# Patient Record
Sex: FEMALE | Race: WHITE | NOT HISPANIC OR LATINO | Employment: PART TIME
[De-identification: names, ages, dates, MRNs, and addresses within clinical notes are randomized per-mention and may not be internally consistent; named-entity substitution may affect disease eponyms.]

---

## 2017-10-14 ENCOUNTER — HEALTH MAINTENANCE LETTER (OUTPATIENT)
Age: 32
End: 2017-10-14

## 2018-01-25 ENCOUNTER — TRANSFERRED RECORDS (OUTPATIENT)
Dept: HEALTH INFORMATION MANAGEMENT | Facility: CLINIC | Age: 33
End: 2018-01-25

## 2019-10-17 ENCOUNTER — OFFICE VISIT - HEALTHEAST (OUTPATIENT)
Dept: FAMILY MEDICINE | Facility: CLINIC | Age: 34
End: 2019-10-17

## 2019-10-17 DIAGNOSIS — J02.9 ACUTE VIRAL PHARYNGITIS: ICD-10-CM

## 2019-10-17 DIAGNOSIS — R03.0 ELEVATED BLOOD PRESSURE READING WITHOUT DIAGNOSIS OF HYPERTENSION: ICD-10-CM

## 2019-10-17 DIAGNOSIS — R07.0 THROAT PAIN: ICD-10-CM

## 2019-10-17 LAB — DEPRECATED S PYO AG THROAT QL EIA: NORMAL

## 2019-10-18 LAB — GROUP A STREP BY PCR: NORMAL

## 2019-11-19 ENCOUNTER — OFFICE VISIT - HEALTHEAST (OUTPATIENT)
Dept: FAMILY MEDICINE | Facility: CLINIC | Age: 34
End: 2019-11-19

## 2019-11-19 DIAGNOSIS — A08.4 VIRAL GASTROENTERITIS: ICD-10-CM

## 2020-02-16 ENCOUNTER — HEALTH MAINTENANCE LETTER (OUTPATIENT)
Age: 35
End: 2020-02-16

## 2020-07-09 ENCOUNTER — TRANSFERRED RECORDS (OUTPATIENT)
Dept: HEALTH INFORMATION MANAGEMENT | Facility: CLINIC | Age: 35
End: 2020-07-09

## 2021-06-02 NOTE — PROGRESS NOTES
Subjective:   Yocasta Barton is a 34 y.o. female and is new to [facility].  Roomed by: Jluis Gray    Refills needed? No    Do you have any forms that need to be filled out? No      Chief Complaint   Patient presents with     Sore Throat     Ear Pain     hread pressure     mucus     brown spot   Says she had a Qtip on her left ear and started having. Right ear pain started yesterday. Woke up with swollen glands today. Admits painful swallowing. Admits frontal headaches. Felt feverish on 10/12. Admits some nasal congestion today with intermittent coughing. Says voice is hoarse. Denies shortness of breath or shortness of breath. Admits being able to eat, drink and urinate normally. Denies any recent nausea, vomiting or diarrhea. Admits some belly pain in left upper abdomen earlier today which has since resolved. Energy level has not changed.     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    Objective:     Vitals:    10/17/19 1533 10/17/19 1610   BP: (!) 134/92 134/90   Patient Position: Sitting    Pulse: 87    Resp: 17    Temp: 98.9  F (37.2  C)    SpO2: 100%    Weight: 158 lb (71.7 kg)    Gen - Pt in NAD  Eyes - Conjunctiva non injected, no drainage  Face - non TTP over frontal sinus areas; non TTP over maxillary areas  Ears - external canals - no induration, Right TM - no5 injected, Left TM - not injected   Nose - not congested, no nasal  drainage  Pharynx - not injected, tonsils 1+ size  Neck - supple, no cervical adenopathy, no masses  Cor - RRR w/o murmur  Lungs - Good air entry; no wheezes or crackles noted on auscultation - no coughing noted  Skin - no lesions, no rashes noted  Neuro - non focal  Psych - Affect - Euthymic, well groomed, speech not pressured, good insight, no flight of ideas      Results for orders placed or performed in visit on 10/17/19   Rapid Strep A Screen-Throat   Result Value Ref Range    Rapid Strep A Antigen No Group A Strep detected, presumptive negative No Group A Strep detected, presumptive negative   Lab result discussed on day of visit.     No results found.   Assessment - Plan   Medical Decision Making - No clinical findings indicative of bacterial infection requiring antibiotics, such as pneumonia, sinusitis or otitis media were ascertained from today's evaluation. RS was negative. Presentation and clinical findings are consistent with a viral process. Symptomatic management and when to follow up discussed as described in Patient Instructions.       1. Acute viral pharyngitis  - Group A Strep, RNA Direct Detection, Throat    2. Elevated blood pressure reading without diagnosis of hypertension    3. Throat pain  - Rapid Strep A Screen-Throat      At the conclusion of the encounter, assessment and plan were discussed. All questions were answered. The patient or guardian acknowledged understanding and was involved in the decision making regarding the overall care plan.    Patient Instructions   1. Keep well hydrated  2. May alternate Tylenol every 6 hours with ibuprofen every 6 hours as needed for fever or pain  3. Follow up with Primary Care provider regarding elevated blood pressure.  4. If you have any questions, call the clinic number  - it's answered 24/7  - You will be contacted within the next 48 hours ONLY if the confirmatory strep test is positive.   - Antibiotics will be prescribed if indicated.  - No  sharing of food or beverage, until 48 hours is past

## 2021-06-03 VITALS
OXYGEN SATURATION: 100 % | WEIGHT: 158 LBS | DIASTOLIC BLOOD PRESSURE: 90 MMHG | RESPIRATION RATE: 17 BRPM | SYSTOLIC BLOOD PRESSURE: 134 MMHG | BODY MASS INDEX: 28.67 KG/M2 | TEMPERATURE: 98.9 F | HEART RATE: 87 BPM

## 2021-06-03 NOTE — PROGRESS NOTES
[facility] Note                                                                                 Date of Visit: 11/19/2019     Chief Complaint   Yocasta Barton is a(n) 34 y.o. White or  female who presents to [facility] with the following complaint(s):  Diarrhea (4 days) and Melena (since yesterday)       Assessment and Plan   1. Viral gastroenteritis  - ondansetron (ZOFRAN ODT) 4 MG disintegrating tablet; Take 1 tablet (4 mg total) by mouth every 8 (eight) hours as needed for nausea.  Dispense: 15 tablet; Refill: 0      Reassured patient that the recent dark discoloration of her stools is most likely due to use of Pepto-Bismol rather than melena secondary to upper GI bleeding. Reviewed typical clinical course and duration of gastrointestinal symptoms with the patient. Discussed symptomatic / supportive cares, including diet progression. Prescribed ondansetron to be used as needed for nausea / vomiting. Discussed close monitoring of hydration status and signs / symptoms of dehydration that would prompt reevaluation. Work excuse provided for yesterday, today, and tomorrow.    Counseled patient regarding assessment and plan for evaluation and treatment. Questions were answered. See AVS for the specific written instructions and educational handout(s) regarding viral gastroenteritis that were provided at the conclusion of the visit.     Discussed signs / symptoms that warrant urgent / emergent medical attention.     Follow up within 2 days if diarrhea persists, as workup for infectious diarrhea would be indicated at that point.      History of Present Illness   Primary symptom: Diarrhea  Onset: 3 days ago  Frequency: Every few hours  Progression: Persisting  Fecal color: Light brown  Fecal consistency: Watery  Malodorous: Initially  Fatty component: Recently  Melena: Stools have been dark since yesterday evening.   Hematochezia: No  Exacerbating factors: None  Relieving factors: No  Abdominal pain:  Cramping prior to bowel movement. Improves with bowel movement, eructation, or flatulence.   Nausea: Yes  Vomitin episodes  Fevers: No  Chills: Yes  Additional symptoms: Having some low back ache. Does not feel lightheaded. Currently menstruating.   History of similar episodes: No  History of Clostridium difficile: No  Ill contacts: Works in a  center.   Recent antibiotic use: No  Consumption of contaminated water: No  Concern for food-borne illness: No  History of abdominal surgery: No  Tobacco use / exposure: Currently smokes -1/3 ppd.   Additional information: Has missed work yesterday and today.      Review of Systems   Review of Systems   All other systems reviewed and are negative.       Physical Exam   Vitals:    19 1330 19 1332   BP: 119/86    Pulse: 93 91   Resp: 16    Temp: 97  F (36.1  C) 98.5  F (36.9  C)   TempSrc:  Oral   SpO2: 97% 99%   Weight: 155 lb (70.3 kg)      Physical Exam  Vitals signs and nursing note reviewed.   Constitutional:       General: She is not in acute distress.     Appearance: She is well-developed and normal weight. She is not ill-appearing or toxic-appearing.   HENT:      Mouth/Throat:      Mouth: Mucous membranes are moist. No oral lesions.   Eyes:      General: Lids are normal. No scleral icterus.     Conjunctiva/sclera: Conjunctivae normal.   Cardiovascular:      Rate and Rhythm: Normal rate and regular rhythm.      Heart sounds: S1 normal and S2 normal. No murmur. No friction rub. No gallop.    Pulmonary:      Effort: Pulmonary effort is normal.      Breath sounds: Normal breath sounds. No stridor. No wheezing, rhonchi or rales.   Abdominal:      General: Bowel sounds are normal. There is no distension.      Palpations: Abdomen is soft. There is no hepatomegaly, splenomegaly or mass.      Tenderness: There is abdominal tenderness in the right lower quadrant, suprapubic area and left lower quadrant. There is no right CVA tenderness, left CVA  tenderness, guarding or rebound.   Skin:     General: Skin is warm and dry.      Coloration: Skin is not jaundiced or pale.      Findings: No rash.   Neurological:      General: No focal deficit present.      Mental Status: She is alert and oriented to person, place, and time.          Diagnostic Studies   Laboratory:  N/A  Radiology:  N/A  Electrocardiogram:  N/A     Procedure Note   N/A     Pertinent History   The following portions of the patient's history were reviewed and updated as appropriate: allergies, current medications, past family history, past medical history, past social history, past surgical history and problem list.    Patient does not have a problem list on file.    Patient has a past medical history of Medical history reviewed with no changes.    Patient has a past surgical history that includes No past surgeries.    Patient's family history includes Hypertension in her father and mother.    Patient reports that she has been smoking. She has been smoking about 0.25 packs per day. She has never used smokeless tobacco. She reports current alcohol use. She reports that she does not use drugs.     Portions of this note have been dictated using voice recognition software. Any grammatical or context distortions are unintentional and inherent to the software.     Abundio Marr MD  [facility]

## 2021-06-04 VITALS
RESPIRATION RATE: 16 BRPM | WEIGHT: 155 LBS | TEMPERATURE: 98.5 F | DIASTOLIC BLOOD PRESSURE: 86 MMHG | BODY MASS INDEX: 28.12 KG/M2 | OXYGEN SATURATION: 99 % | HEART RATE: 91 BPM | SYSTOLIC BLOOD PRESSURE: 119 MMHG

## 2021-06-17 NOTE — PATIENT INSTRUCTIONS - HE
Patient Instructions by Abundio Marr MD at 11/19/2019 12:50 PM     Author: Abundio Marr MD Service: -- Author Type: Physician    Filed: 11/19/2019  2:01 PM Encounter Date: 11/19/2019 Status: Addendum    : Abundio Marr MD (Physician)    Related Notes: Original Note by Abundio Marr MD (Physician) filed at 11/19/2019  2:01 PM       -Your symptoms are consistent with viral gastroenteritis, though your symptoms are lasting slightly longer than typically expected.  -Use ondansetron as needed for nausea.  -Focus on a clear liquid diet for today.  -Then gradually resume a bland diet, starting with bananas, rice, applesauce, and toast.  As your symptoms improved, you may resume a regular diet as tolerated, resuming dairy products last.  -The dark coloration of your stool is likely related to the Pepto-Bismol you took yesterday.  Please stop using Pepto-Bismol and monitor for resolution of dark-colored stools.  -Follow-up within 2 days if your symptoms persist.  Return sooner if you develop severe abdominal pain, bloody diarrhea, lightheadedness, or other concerning symptoms.  Patient Education     Viral Gastroenteritis (Adult)    Gastroenteritis is commonly called the stomach flu. It is most often caused by a virus that affects the stomach and intestinal tract and usually lasts from 2 to 7 days. Common viruses causing gastroenteritis include norovirus, rotavirus, and hepatitis A. Non-viral causes of gastroenteritis include bacteria, parasites, and toxins.  The danger from repeated vomiting or diarrhea is dehydration. This is the loss of too much fluid from the body. When this occurs, body fluids must be replaced. Antibiotics do not help with this illness because it is usually viral.Simple home treatment will be helpful.  Symptoms of viral gastroenteritis may include:    Watery, loose stools    Stomach pain or abdominal cramps    Fever and chills    Nausea and vomiting    Loss of bowel  control    Headache  Home care  Gastroenteritis is transmitted by contact with the stool or vomit of an infected person. This can occur from person to person or from contact with a contaminated surface.  Follow these guidelines when caring for yourself at home:    If symptoms are severe, rest at home for the next 24 hours or until you are feeling better.    Wash your hands with soap and water or use alcohol-based  to prevent the spread of infection. Wash your hands after touching anyone who is sick.    Wash your hands or use alcohol-based  after using the toilet and before meals. Clean the toilet after each use.  Remember these tips when preparing food:    People with diarrhea should not prepare or serve food to others. When preparing foods, wash your hands before and after.    Wash your hands after using cutting boards, countertops, knives, or utensils that have been in contact with raw food.    Keep uncooked meats away from cooked and ready-to-eat foods.  Medicine  You may use acetaminophen or NSAID medicines like ibuprofen or naproxen to control fever unless another medicine was given. If you have chronic liver or kidney disease, talk with your healthcare provider before using these medicines. Also talk with your provider if you've had a stomach ulcer or gastrointestinal bleeding. Don't give aspirin to anyone under 18 years of age who is ill with a fever. It may cause severe liver damage. Don't use NSAIDS is you are already taking one for another condition (like arthritis) or are on aspirin (such as for heart disease or after a stroke).  If medicine for vomiting or diarrhea are prescribed, take these only as directed. Do not take over-the-counter medicines for vomiting or diarrhea unless instructed by your healthcare provider.  Diet  Follow these guidelines for food:    Water and liquids are important so you don't get dehydrated. Drink a small amount at a time or suck on ice chips if you are  vomiting.    If you eat, avoid fatty, greasy, spicy, or fried foods.    Don't eat dairy if you have diarrhea. This can make diarrhea worse.    Avoid tobacco, alcohol, and caffeine which may worsen symptoms.  During the first 24 hours (the first full day), follow the diet below:    Beverages. Sports drinks, soft drinks without caffeine, ginger ale, mineral water (plain or flavored), decaffeinated tea and coffee. If you are very dehydrated, sports drinks aren't a good choice. They have too much sugar and not enough electrolytes. In this case, commercially available products called oral rehydration solutions, are best.    Soups. Eat clear broth, consommé, and bouillon.    Desserts. Eat gelatin, popsicles, and fruit juice bars.  During the next 24 hours (the second day), you may add the following to the above:    Hot cereal, plain toast, bread, rolls, and crackers    Plain noodles, rice, mashed potatoes, chicken noodle or rice soup    Unsweetened canned fruit (avoid pineapple), bananas    Limit fat intake to less than 15 grams per day. Do this by avoiding margarine, butter, oils, mayonnaise, sauces, gravies, fried foods, peanut butter, meat, poultry, and fish.    Limit fiber and avoid raw or cooked vegetables, fresh fruits (except bananas), and bran cereals.    Limit caffeine and chocolate. Don't use spices or seasonings other than salt.    Limit dairy products.    Avoid alcohol.  During the next 24 hours:    Gradually resume a normal diet as you feel better and your symptoms improve.    If at any time it starts getting worse again, go back to clear liquids until you feel better.  Follow-up care  Follow up with your healthcare provider, or as advised. Call your provider if you don't get better within 24 hours or if diarrhea lasts more than a week. Also follow up if you are unable to keep down liquids and get dehydrated. If a stool (diarrhea) sample was taken, call as directed for the results.  Call 911  Call 911 if any  of these occur:    Trouble breathing    Chest pain    Confused    Severe drowsiness or trouble awakening    Fainting or loss of consciousness    Rapid heart rate    Seizure    Stiff neck  When to seek medical advice  Call your healthcare provider right away if any of these occur:    Abdominal pain that gets worse    Continued vomiting (unable to keep liquids down)    Frequent diarrhea (more than 5 times a day)    Blood in vomit or stool (black or red color)    Dark urine, reduced urine output, or extreme thirst    Weakness or dizziness    Drowsiness    Fever of 100.4 F (38 C) or higher, or as directed by your healthcare provider    New rash  Date Last Reviewed: 1/3/2016    6411-2943 [employer]. [address] [place] [address], [place] [address]. All rights reserved. This information is not intended as a substitute for professional medical care. Always follow your healthcare professional's instructions.

## 2021-06-17 NOTE — PATIENT INSTRUCTIONS - HE
Patient Instructions by Clau Moralez MD at 10/17/2019  3:30 PM     Author: Clau Moralez MD Service: -- Author Type: Physician    Filed: 10/17/2019  4:45 PM Encounter Date: 10/17/2019 Status: Addendum    : Clau Moralez MD (Physician)    Related Notes: Original Note by Clau Moralez MD (Physician) filed at 10/17/2019  4:44 PM       1. Keep well hydrated  2. May alternate Tylenol every 6 hours with ibuprofen every 6 hours as needed for fever or pain  3. Follow up with Primary Care provider regarding elevated blood pressure.  4. If you have any questions, call the clinic number  - it's answered 24/7  - You will be contacted within the next 48 hours ONLY if the confirmatory strep test is positive.   - Antibiotics will be prescribed if indicated.  - No sharing of food or beverage, until 48 hours is past     Patient Education     Viral Pharyngitis (Sore Throat)    You or your child have pharyngitis (sore throat). This infection is caused by a virus. It can cause throat pain that is worse when swallowing, aching all over, headache, and fever. The infection may be spread by coughing, kissing, or touching others after touching your mouth or nose. Antibiotic medicines do not work against viruses. They are not used for treating this illness.  Home care    If symptoms are severe, you or your child should rest at home. Return to work or school when you or your child feel well enough.     You or your child should drink plenty of fluids to prevent dehydration.    Use throat lozenges or numbing throat sprays to help reduce pain. Gargling with warm salt water will also help reduce throat pain. Dissolve 1/2 teaspoon of salt in 1 glass of warm water. Children can sip on juice or a popsicle. Children 5 years and older can also suck on a lollipop or hard candy.    Dont eat salty or spicy foods or give them to your child. These can be irritating to the throat.  Medicines for a child: You can give your child  acetaminophen for fever, fussiness, or discomfort. In babies over 6 months of age, you may use ibuprofen instead of acetaminophen. If your child has chronic liver or kidney disease or ever had a stomach ulcer or GI bleeding, talk with your [facility] healthcare provider before giving these medicines. Aspirin should never be used by any child under 18 years of age who has a fever. It may cause severe liver damage.  Medicines for an adult: You may use acetaminophen or ibuprofen to control pain or fever, unless another medicine was prescribed for this. If you have chronic liver or kidney disease or ever had a stomach ulcer or GI bleeding, talk with your healthcare provider before using these medicines.  Follow-up care  Follow up with a healthcare provider or our staff if you or your child are not getting better over the next week.  When to seek medical advice  Call your healthcare provider right away if any of these occur:    Fever as directed by your healthcare provider.  For children, seek care if:  ? Your child is of any age and has repeated fevers above 104 F (40 C).  ? Your child is younger than 2 years of age and has a fever of 100.4 F (38 C) for more than 1 day.  ? Your child is 2 years old or older and has a fever of 100.4 F (38 C) for more than 3 days.    New or worsening ear pain, sinus pain, or headache    Painful lumps in the back of neck    Stiff neck    Lymph nodes are getting larger    Cant swallow liquids, a lot of drooling, or cant open mouth wide due to throat pain    Signs of dehydration, such as very dark urine or no urine, sunken eyes, dizziness    Trouble breathing or noisy breathing    Muffled voice    New rash    Other symptoms are getting worse  Date Last Reviewed: 10/1/2017    [id number] [employer]. [address] [place] [address]. All rights reserved. This information is not intended as a substitute for professional medical care. Always follow your healthcare  professional's instructions.             Patient Education     Earwax Removal    The ear canal makes earwax from the canals lining. The ears make wax to lubricate and protect the ear canal. The ear canal is the tube that connects the middle ear to the outside of the ear. The wax protects the ear from bacteria, infection, and damage from water or trauma.  The wax that forms in the canal naturally moves toward the outside of the ear and falls out. In some cases, the ear may make too much wax. If the wax causes problems or keeps the healthcare provider from seeing into the ear, the extra wax may be removed.  Too much wax can affect your hearing. It can cause itching. In rare cases, it can be painful. Earwax should not be removed unless it is causing a problem. You should not stick objects into your ear to remove wax unless told to do so by your healthcare provider.  Healthcare providers can remove earwax safely. It is important to stay still during the procedure to avoid damage to the ear canal. But removing earwax generally doesnt hurt. You will not usually need anesthesia or pain medicine when the provider removes the earwax.  A number of conditions lead to earwax buildup. These include some skin problems, a narrow ear canal, or ears that make too much earwax. Using cotton swabs in the canal pushes earwax deeper into the ear and contributes to the buildup of earwax.  Home care    The healthcare provider may recommend mineral oil or an over-the-counter eardrop to use at home to soften the earwax. Use these products only if the provider recommends them. Carefully follow the instructions given.    Dont use mineral oil or OTC eardrops if you might have an ear infection or a ruptured eardrum. Tell your healthcare provider right away if you have diabetes or an immune disorder.    Dont use cotton swabs in your ears. Cotton swabs may push wax deeper into the ear canal or damage the eardrum. Use cotton gauze or a wet  washcloth  to gently remove wax on the outside of the ear and around the opening to the ear canal.    Don't use any probing device or object such as cotton-tipped swabs or ana m pins to clean the inside of your ears.    Dont use ear candles to clean your ears. Candling can be dangerous. It can burn the ear canal. It can also make the condition worse instead of better.    Dont use cold water to rinse the ear. This will make you dizzy. If your provider tells you to rinse your ear, use only warm water or follow his or her instructions.    Check the ear for signs of infection or irritation listed below under When to seek medical advice.  Steps for using eardrops  1. Warm the medicine bottle by rubbing it between your hands for a few minutes.  2. Lie down on your side, with the affected ear up.  3. Place the recommended number of drops in the ear. Wet a cotton ball with the medicine. Gently put the cotton ball into the ear opening.  Follow-up care  Follow up with your healthcare provider, or as directed.  When to seek medical advice  Call the provider right away if you have:    Ear pain that gets worse    Fever of 100.4F F (38 C) or higher, or as directed by your healthcare provider    Worsening wax buildup    Severe pain, dizziness, or nausea    Bleeding from the ear    Hearing problems    Signs of irritation from the eardrops, such as burning, stinging, or swelling and tenderness    Foul-smelling fluid draining from the ear    Swelling, redness, or tenderness of the outer ear    Headache, neck pain, or stiff neck  Date Last Reviewed: 6/1/2017 2000-2017 [employer]. [address] [place] [id number]. All rights reserved. This information is not intended as a substitute for professional medical care. Always follow your healthcare professional's instructions.

## 2021-06-19 NOTE — LETTER
Letter by Abundio Marr MD at      Author: Abundio Marr MD Service: -- Author Type: --    Filed:  Encounter Date: 11/19/2019 Status: Signed         November 19, 2019     Patient: Yocasta Barton   YOB: 1985   Date of Visit: 11/19/2019       To Whom it May Concern:    Yocasta Barton was seen in my clinic on 11/19/2019.  Please excuse her absence from work on Monday, 11/18/2019, Tuesday, 11/19/2019, and Wednesday, 11/20/2019 due to illness.    If you have any questions or concerns, please don't hesitate to call.    Sincerely,         Electronically signed by Abundio Marr MD

## 2021-06-19 NOTE — LETTER
Letter by Clau Moralez MD at      Author: Clau Moralez MD Service: -- Author Type: --    Filed:  Encounter Date: 10/17/2019 Status: Signed         October 17, 2019     Patient: Yocasta Barton   YOB: 1985   Date of Visit: 10/17/2019       To Whom it May Concern:    Yocasta Barton was seen in my clinic on 10/17/2019.    She may return to work on 10/18/2019.     If you have any questions or concerns, please don't hesitate to call.    Sincerely,         Electronically signed by Clau Moralez MD

## 2021-07-28 ENCOUNTER — OFFICE VISIT (OUTPATIENT)
Dept: FAMILY MEDICINE | Facility: CLINIC | Age: 36
End: 2021-07-28
Payer: COMMERCIAL

## 2021-07-28 VITALS
BODY MASS INDEX: 30.03 KG/M2 | SYSTOLIC BLOOD PRESSURE: 110 MMHG | WEIGHT: 169.5 LBS | OXYGEN SATURATION: 97 % | DIASTOLIC BLOOD PRESSURE: 80 MMHG | HEART RATE: 97 BPM | HEIGHT: 63 IN

## 2021-07-28 DIAGNOSIS — F17.200 NICOTINE DEPENDENCE, UNCOMPLICATED, UNSPECIFIED NICOTINE PRODUCT TYPE: ICD-10-CM

## 2021-07-28 DIAGNOSIS — Z00.00 ROUTINE PHYSICAL EXAMINATION: Primary | ICD-10-CM

## 2021-07-28 DIAGNOSIS — K08.89 PAIN, DENTAL: ICD-10-CM

## 2021-07-28 DIAGNOSIS — S09.90XD CLOSED HEAD INJURY, SUBSEQUENT ENCOUNTER: ICD-10-CM

## 2021-07-28 DIAGNOSIS — Z11.3 SCREEN FOR STD (SEXUALLY TRANSMITTED DISEASE): ICD-10-CM

## 2021-07-28 DIAGNOSIS — Z30.41 ORAL CONTRACEPTIVE USE: ICD-10-CM

## 2021-07-28 LAB
ALBUMIN SERPL-MCNC: 3.6 G/DL (ref 3.5–5)
ALP SERPL-CCNC: 67 U/L (ref 45–120)
ALT SERPL W P-5'-P-CCNC: 14 U/L (ref 0–45)
ANION GAP SERPL CALCULATED.3IONS-SCNC: 12 MMOL/L (ref 5–18)
AST SERPL W P-5'-P-CCNC: 15 U/L (ref 0–40)
BILIRUB SERPL-MCNC: 0.5 MG/DL (ref 0–1)
BUN SERPL-MCNC: 7 MG/DL (ref 8–22)
CALCIUM SERPL-MCNC: 9 MG/DL (ref 8.5–10.5)
CHLORIDE BLD-SCNC: 108 MMOL/L (ref 98–107)
CHOLEST SERPL-MCNC: 179 MG/DL
CO2 SERPL-SCNC: 19 MMOL/L (ref 22–31)
CREAT SERPL-MCNC: 0.62 MG/DL (ref 0.6–1.1)
ERYTHROCYTE [DISTWIDTH] IN BLOOD BY AUTOMATED COUNT: 12.5 % (ref 10–15)
FASTING STATUS PATIENT QL REPORTED: ABNORMAL
GFR SERPL CREATININE-BSD FRML MDRD: >90 ML/MIN/1.73M2
GLUCOSE BLD-MCNC: 85 MG/DL (ref 70–125)
HCT VFR BLD AUTO: 41.2 % (ref 35–47)
HDLC SERPL-MCNC: 44 MG/DL
HGB BLD-MCNC: 13.7 G/DL (ref 11.7–15.7)
LDLC SERPL CALC-MCNC: 118 MG/DL
MCH RBC QN AUTO: 30.6 PG (ref 26.5–33)
MCHC RBC AUTO-ENTMCNC: 33.3 G/DL (ref 31.5–36.5)
MCV RBC AUTO: 92 FL (ref 78–100)
PLATELET # BLD AUTO: 298 10E3/UL (ref 150–450)
POTASSIUM BLD-SCNC: 4.1 MMOL/L (ref 3.5–5)
PROT SERPL-MCNC: 6.5 G/DL (ref 6–8)
RBC # BLD AUTO: 4.47 10E6/UL (ref 3.8–5.2)
SODIUM SERPL-SCNC: 139 MMOL/L (ref 136–145)
TRIGL SERPL-MCNC: 86 MG/DL
WBC # BLD AUTO: 9.3 10E3/UL (ref 4–11)

## 2021-07-28 PROCEDURE — 86780 TREPONEMA PALLIDUM: CPT | Performed by: FAMILY MEDICINE

## 2021-07-28 PROCEDURE — 36415 COLL VENOUS BLD VENIPUNCTURE: CPT | Performed by: FAMILY MEDICINE

## 2021-07-28 PROCEDURE — 87624 HPV HI-RISK TYP POOLED RSLT: CPT | Performed by: FAMILY MEDICINE

## 2021-07-28 PROCEDURE — 80061 LIPID PANEL: CPT | Performed by: FAMILY MEDICINE

## 2021-07-28 PROCEDURE — 99214 OFFICE O/P EST MOD 30 MIN: CPT | Mod: 25 | Performed by: FAMILY MEDICINE

## 2021-07-28 PROCEDURE — 80053 COMPREHEN METABOLIC PANEL: CPT | Performed by: FAMILY MEDICINE

## 2021-07-28 PROCEDURE — 87491 CHLMYD TRACH DNA AMP PROBE: CPT | Performed by: FAMILY MEDICINE

## 2021-07-28 PROCEDURE — G0123 SCREEN CERV/VAG THIN LAYER: HCPCS | Performed by: FAMILY MEDICINE

## 2021-07-28 PROCEDURE — 99395 PREV VISIT EST AGE 18-39: CPT | Performed by: FAMILY MEDICINE

## 2021-07-28 PROCEDURE — 87340 HEPATITIS B SURFACE AG IA: CPT | Performed by: FAMILY MEDICINE

## 2021-07-28 PROCEDURE — 87591 N.GONORRHOEAE DNA AMP PROB: CPT | Performed by: FAMILY MEDICINE

## 2021-07-28 PROCEDURE — 85027 COMPLETE CBC AUTOMATED: CPT | Performed by: FAMILY MEDICINE

## 2021-07-28 RX ORDER — IBUPROFEN 200 MG
200 TABLET ORAL EVERY 4 HOURS PRN
COMMUNITY
End: 2022-08-18

## 2021-07-28 RX ORDER — NORETHINDRONE ACETATE AND ETHINYL ESTRADIOL 1MG-20(21)
1 KIT ORAL DAILY
Qty: 84 TABLET | Refills: 2 | Status: SHIPPED | OUTPATIENT
Start: 2021-07-28 | End: 2022-04-13

## 2021-07-28 RX ORDER — AMOXICILLIN 875 MG
875 TABLET ORAL 2 TIMES DAILY
Qty: 10 TABLET | Refills: 0 | Status: SHIPPED | OUTPATIENT
Start: 2021-07-28 | End: 2021-10-26

## 2021-07-28 ASSESSMENT — ENCOUNTER SYMPTOMS
NECK STIFFNESS: 0
DYSPHORIC MOOD: 0
PARESTHESIAS: 0
CONSTITUTIONAL NEGATIVE: 1
HEADACHES: 1
LIGHT-HEADEDNESS: 0
NUMBNESS: 0
NECK PAIN: 0

## 2021-07-28 ASSESSMENT — ANXIETY QUESTIONNAIRES
GAD7 TOTAL SCORE: 13
1. FEELING NERVOUS, ANXIOUS, OR ON EDGE: MORE THAN HALF THE DAYS
3. WORRYING TOO MUCH ABOUT DIFFERENT THINGS: MORE THAN HALF THE DAYS
7. FEELING AFRAID AS IF SOMETHING AWFUL MIGHT HAPPEN: SEVERAL DAYS
IF YOU CHECKED OFF ANY PROBLEMS ON THIS QUESTIONNAIRE, HOW DIFFICULT HAVE THESE PROBLEMS MADE IT FOR YOU TO DO YOUR WORK, TAKE CARE OF THINGS AT HOME, OR GET ALONG WITH OTHER PEOPLE: SOMEWHAT DIFFICULT
5. BEING SO RESTLESS THAT IT IS HARD TO SIT STILL: MORE THAN HALF THE DAYS
2. NOT BEING ABLE TO STOP OR CONTROL WORRYING: MORE THAN HALF THE DAYS
6. BECOMING EASILY ANNOYED OR IRRITABLE: MORE THAN HALF THE DAYS
4. TROUBLE RELAXING: MORE THAN HALF THE DAYS

## 2021-07-28 ASSESSMENT — MIFFLIN-ST. JEOR: SCORE: 1415.04

## 2021-07-28 NOTE — PATIENT INSTRUCTIONS
The Benefits of Living Tobacco-Free  What do you want to improve in your life by quitting tobacco? Whether you smoke cigarettes, e-cigarettes, cigars, or a pipe, or use smokeless tobacco, there are many reasons to quit. Check off some reasons you want to be tobacco-free.  Health benefits  ___  I want to breathe without coughing or feeling short of breath.  ___  I want to reduce my risk for lung cancer, oral cancer, heart disease, bone problems such as osteoporosis and fractures, and chronic lung disease. Or reduce my risk for a serious lung injury called EVALI that may happen from vaping or using e-cigarettes.  ___  I want to have fewer wrinkles and softer skin.  ___  I want to improve my sense of taste and smell.  ___  For pregnant women: I want to reduce my risk for a miscarriage, stillbirth,  birth, or a low-birth-weight baby.  Personal benefits  ___  I want to be able to walk, go up stairs, and exercise without becoming out of breath.  ___  I want to feel more in control of my life.  ___  I want to have better-smelling hair, clothes, home, and car.  ___  I want to save time by not having to take smoke breaks, buy tobacco products, or hunt for a light.  ___  I want to have whiter teeth and fresher breath.  Family benefits  ___  I want to reduce my child's respiratory tract infections.  ___  I want to set a healthy example for my child.  ___  I want to have the energy needed to play with my children and not become out-of-breath  ___  I want to reduce my family s cancer risk.  Financial benefits  ___  I want to save hundreds of dollars each year that would be spent on tobacco products.  ___  I want to save money on medical bills.  ___  I want to save money on life, health, and car insurance premiums.  How much do you spend?  A tobacco habit is expensive. Do you know how much you spend on tobacco each year? Fill in the blanks below to find out:  A. How much do you pay for 1 pack of cigarettes, 1 cigar, 1  pouch of pipe tobacco, or 1 can of smokeless tobacco? $________  B. How many packs, cigars, pouches, or cans do you use each day? _______________  C. Multiply answer A with answer B:___________________  D. Multiply answer C with 365: $___________________. This is your yearly cost of using tobacco.  Besides the cost of buying tobacco, there are other costs. These include:    Costs of cleaning clothing, furniture, and car    Replacement costs for clothing and furniture    Medical costs for tobacco-related illnesses    Missed days of work because of illness    Higher health, life, and car insurance premiums  Get help    QuitNow, www.cdc.gov/tobacco/quit_smoking/, 800-QUIT-NOW (413-392-7490).    QuitLine, www.smokefree.gov, 877-44U-QUIT (696-040-6559).    [employer] last reviewed this educational content on 4/1/2020 2000-2021 [employer]. All rights reserved. This information is not intended as a substitute for professional medical care. Always follow your healthcare professional's instructions.        HOW TO QUIT SMOKING  Smoking is one of the hardest habits to break. About half of all those who have ever smoked have been able to quit, and most of those (about 70%) who still smoke want to quit. Here are some of the best ways to stop smoking.     KEEP TRYING:  It takes most smokers about 8 tries before they are finally able to fully quit. So, the more often you try and fail, the better your chance of quitting the next time! So, don't give up!    GO COLD TURKEY:  Most ex-smokers quit cold turkey. Trying to cut back gradually doesn't seem to work as well, perhaps because it continues the smoking habit. Also, it is possible to fool yourself by inhaling more while smoking fewer cigarettes. This results in the same amount of nicotine in your body!    GET SUPPORT:  Support programs can make an important difference, especially for the heavy smoker. These groups offer lectures, methods to change your behavior and  peer support. Call the free national Quitline for more information. 800-QUIT-NOW (769-081-2654). Low-cost or free programs are offered by many hospitals, local chapters of the American Lung Association (159-823-1252) and the American Cancer Society (308-607-5979). Support at home is important too. Non-smokers can help by offering praise and encouragement. If the smoker fails to quit, encourage them to try again!    OVER-THE-COUNTER MEDICINES:  For those who can't quit on their own, Nicotine Replacement Therapy (NRT) may make quitting much easier. Certain aids such as the nicotine patch, gum and lozenge are available without a prescription. However, it is best to use these under the guidance of your doctor. The skin patch provides a steady supply of nicotine to the body. Nicotine gum and lozenge gives temporary bursts of low levels of nicotine. Both methods take the edge off the craving for cigarettes. WARNING: If you feel symptoms of nicotine overdose, such as nausea, vomiting, dizziness, weakness, or fast heartbeat, stop using these and see your doctor.    PRESCRIPTION MEDICINES:  After evaluating your smoking patterns and prior attempts at quitting, your doctor may offer a prescription medicine such as bupropion (Zyban, Wellbutrin), varenicline (Chantix, Champix), a niocotine inhaler or nasal spray. Each has its unique advantage and side effects which your doctor can review with you.    HEALTH BENEFITS OF QUITTING:  The benefits of quitting start right away and keep improving the longer you go without smokin minutes: blood pressure and pulse return to normal  8 hours: oxygen levels return to normal  2 days: ability to smell and taste begins to improve as damaged nerves start to regrow  2-3 weeks: circulation and lung function improves  1-9 months: decreased cough, congestion and shortness of breath; less tired  1 year: risk of heart attack decreases by half  5 years: risk of lung cancer decreases by half;  risk of stroke becomes the same as a non-smoker  For information about how to quit smoking, visit the following links:  National Cancer [employer] ,   Clearing the Air, Quit Smoking Today   - an online booklet. http://www.smokefree.gov/pubs/clearing_the_air.pdf  Smokefree.gov http://smokefree.gov/  QuitNet http://www.quitnet.com/    6738-0817 Sonny Lobo, [address] [place] [address], [place] [address]. All rights reserved. This information is not intended as a substitute for professional medical care. Always follow your healthcare professional's instructions.
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
## 2021-07-29 LAB
HBV SURFACE AG SERPL QL IA: NONREACTIVE
T PALLIDUM AB SER QL: NEGATIVE

## 2021-07-30 LAB
C TRACH DNA SPEC QL NAA+PROBE: NEGATIVE
N GONORRHOEA DNA SPEC QL NAA+PROBE: NEGATIVE

## 2021-08-04 LAB
HUMAN PAPILLOMA VIRUS 16 DNA: NEGATIVE
HUMAN PAPILLOMA VIRUS 18 DNA: NEGATIVE
HUMAN PAPILLOMA VIRUS FINAL DIAGNOSIS: NORMAL
HUMAN PAPILLOMA VIRUS OTHER HR: NEGATIVE

## 2021-08-05 LAB
BKR LAB AP GYN ADEQUACY: NORMAL
BKR LAB AP GYN INTERPRETATION: NORMAL
BKR LAB AP HPV REFLEX: NORMAL
BKR LAB AP LMP: NORMAL
BKR LAB AP PREVIOUS ABNORMAL: NORMAL
PATH REPORT.COMMENTS IMP SPEC: NORMAL
PATH REPORT.RELEVANT HX SPEC: NORMAL

## 2021-08-16 ENCOUNTER — VIRTUAL VISIT (OUTPATIENT)
Dept: NEUROLOGY | Facility: CLINIC | Age: 36
End: 2021-08-16
Payer: COMMERCIAL

## 2021-08-16 DIAGNOSIS — G47.00 INSOMNIA, UNSPECIFIED TYPE: ICD-10-CM

## 2021-08-16 DIAGNOSIS — F07.81 POST CONCUSSION SYNDROME: Primary | ICD-10-CM

## 2021-08-16 DIAGNOSIS — F06.4 ANXIETY DISORDER DUE TO MEDICAL CONDITION: ICD-10-CM

## 2021-08-16 PROCEDURE — 99205 OFFICE O/P NEW HI 60 MIN: CPT | Mod: 95 | Performed by: NURSE PRACTITIONER
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
## 2021-09-07 ENCOUNTER — HOSPITAL ENCOUNTER (OUTPATIENT)
Dept: PHYSICAL THERAPY | Facility: CLINIC | Age: 36
Setting detail: THERAPIES SERIES
End: 2021-09-07
Attending: NURSE PRACTITIONER
Payer: COMMERCIAL

## 2021-09-07 PROCEDURE — 97535 SELF CARE MNGMENT TRAINING: CPT | Mod: GP | Performed by: PHYSICAL THERAPIST

## 2021-09-07 PROCEDURE — 97162 PT EVAL MOD COMPLEX 30 MIN: CPT | Mod: GP | Performed by: PHYSICAL THERAPIST

## 2021-09-07 NOTE — PROGRESS NOTES
Vestibular/Ocular Motor Test:     Not Tested Headache Dizziness Nausea Fogginess Comments   Baseline  0 0 0 5    Smooth Pursuits  0 0 0 5    Saccades-Horizontal  0 0 0 5 Left eye and left temple pressure   Saccades-Vertical  0 0 0 5 Left eye and left temple pressure   Convergence (Near Point)  0 0 0 5 (Near Point in CM)  Measure 1: 5 cm  Measure 2: 4cm  Measure 3 5cm   VOR Horizontal  0 0 0 5 Difficulty coordinating and keeping up with speed of movement   VOR Vertical  0 0 0 5 Difficulty coordinating and keeping up with speed of movement   Visual Motion Sensitivity Test  0 0 0 5 Difficulty coordinating and keeping up with speed of movement

## 2021-09-07 NOTE — PROGRESS NOTES
Rehabilitation Services        OUTPATIENT PHYSICAL THERAPY FUNCTIONAL EVALUATION  PLAN OF TREATMENT FOR OUTPATIENT REHABILITATION  (COMPLETE FOR INITIAL CLAIMS ONLY)  Patient's Last Name, First Name, M.I.  YOB: 1985  Yocasta Kohli     Provider's Name   Teodora Slade PT   Medical Record No.  [id number]     Start of Care Date:  09/07/21   Onset Date:  07/15/21   Type:     _X__PT   ____OT  ____SLP Medical Diagnosis:  Post concussion syndrome F07.81     PT Diagnosis:  Concussion Visits from SOC:  1                              __________________________________________________________________________________  Plan of Treatment/Functional Goals:  balance training, gait training, neuromuscular re-education, ROM, strengthening, stretching, manual therapy, joint mobilization           GOALS  HEP  Patient will demonstrate understanding and compliance to her HEP for continued wellbeing upon discharge from skilled physical therapy.  11/16/21    CSA  Patient will complete the CSA with a score of <16 to demonstrate decreased overall symptoms for return to work and leisure activities without limitation.  11/16/21    FGA  Patient will complete the FGA with a score of 28/30 to demonstrate improved balance and decreased risk for falls.  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   11/16/21                                                Therapy Frequency:  1 time/week (Decreasing in frequency as indicated)   Predicted Duration of Therapy Intervention:  8 weeks    Teodora Slade, PT, DPT                                    I CERTIFY THE NEED FOR THESE SERVICES FURNISHED UNDER        THIS PLAN OF TREATMENT AND WHILE UNDER MY CARE     (Physician co-signature of this document indicates  review and certification of the therapy plan).                Certification Date From:  09/07/21   Certification Date To:  11/16/21    Referring Provider:  Krista De Luna APRN CNP    Initial Assessment  See Epic Evaluation- Start of Care Date: 09/07/21
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
## 2021-09-14 ENCOUNTER — VIRTUAL VISIT (OUTPATIENT)
Dept: NEUROLOGY | Facility: CLINIC | Age: 36
End: 2021-09-14
Payer: COMMERCIAL

## 2021-09-14 DIAGNOSIS — T14.90XA TRAUMA: ICD-10-CM

## 2021-09-14 DIAGNOSIS — F07.81 POST CONCUSSION SYNDROME: Primary | ICD-10-CM

## 2021-09-14 PROCEDURE — 99214 OFFICE O/P EST MOD 30 MIN: CPT | Mod: 95 | Performed by: NURSE PRACTITIONER
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
## 2021-09-22 ENCOUNTER — HOSPITAL ENCOUNTER (OUTPATIENT)
Dept: PHYSICAL THERAPY | Facility: CLINIC | Age: 36
Setting detail: THERAPIES SERIES
End: 2021-09-22
Attending: NURSE PRACTITIONER
Payer: COMMERCIAL

## 2021-09-22 PROCEDURE — 97112 NEUROMUSCULAR REEDUCATION: CPT | Mod: GP | Performed by: PHYSICAL THERAPIST

## 2021-09-22 NOTE — DISCHARGE INSTRUCTIONS
9/22/21  IDEAS:    Wear a hat or visor.  This will block overhead light and will maybe decreased headache.    Try to get sleep cycle back to a more normal.      Triggers:  Loud voices, loud music.  Loud truck. Lack sleep.   Other?     ---use the earplugs  ---take breaks  ---keep walking the 20 min walks    Keep stressors down as much as you can.     Take care  Cathryn  [phone]           If you want a video visit, call and let us know.

## 2021-10-01 ENCOUNTER — HOSPITAL ENCOUNTER (OUTPATIENT)
Dept: PHYSICAL THERAPY | Facility: CLINIC | Age: 36
Setting detail: THERAPIES SERIES
End: 2021-10-01
Attending: NURSE PRACTITIONER
Payer: COMMERCIAL

## 2021-10-01 PROCEDURE — 97112 NEUROMUSCULAR REEDUCATION: CPT | Mod: GP | Performed by: PHYSICAL THERAPIST

## 2021-10-01 PROCEDURE — 97140 MANUAL THERAPY 1/> REGIONS: CPT | Mod: GP | Performed by: PHYSICAL THERAPIST

## 2021-10-04 ENCOUNTER — VIRTUAL VISIT (OUTPATIENT)
Dept: NEUROLOGY | Facility: CLINIC | Age: 36
End: 2021-10-04
Payer: COMMERCIAL

## 2021-10-04 DIAGNOSIS — F07.81 POST CONCUSSION SYNDROME: ICD-10-CM

## 2021-10-04 DIAGNOSIS — F43.10 PTSD (POST-TRAUMATIC STRESS DISORDER): Primary | ICD-10-CM

## 2021-10-04 DIAGNOSIS — T14.90XA TRAUMA: ICD-10-CM

## 2021-10-04 PROCEDURE — 90834 PSYTX W PT 45 MINUTES: CPT | Mod: 95 | Performed by: PSYCHOLOGIST
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
## 2021-10-11 ENCOUNTER — VIRTUAL VISIT (OUTPATIENT)
Dept: NEUROLOGY | Facility: CLINIC | Age: 36
End: 2021-10-11
Payer: COMMERCIAL

## 2021-10-11 DIAGNOSIS — F43.10 PTSD (POST-TRAUMATIC STRESS DISORDER): Primary | ICD-10-CM

## 2021-10-11 PROCEDURE — 90791 PSYCH DIAGNOSTIC EVALUATION: CPT | Mod: 95 | Performed by: PSYCHOLOGIST
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
## 2021-10-14 ENCOUNTER — HOSPITAL ENCOUNTER (OUTPATIENT)
Dept: PHYSICAL THERAPY | Facility: CLINIC | Age: 36
Setting detail: THERAPIES SERIES
End: 2021-10-14
Attending: NURSE PRACTITIONER
Payer: COMMERCIAL

## 2021-10-14 PROCEDURE — 97112 NEUROMUSCULAR REEDUCATION: CPT | Mod: GP | Performed by: PHYSICAL THERAPIST

## 2021-10-14 NOTE — DISCHARGE INSTRUCTIONS
10/14/21    PROP Phone up on lap w/ a good pillow; eyes and phone same level  NECK position is important   Chin tucks if that helps.      ASK Krista about the ampytripline.   Ask her - about working 2 hours at a time.   Try to have job where you have one task, not a lot of multi tasking.

## 2021-10-26 ENCOUNTER — VIRTUAL VISIT (OUTPATIENT)
Dept: NEUROLOGY | Facility: CLINIC | Age: 36
End: 2021-10-26
Payer: COMMERCIAL

## 2021-10-26 DIAGNOSIS — G47.00 INSOMNIA, UNSPECIFIED TYPE: ICD-10-CM

## 2021-10-26 DIAGNOSIS — F43.10 PTSD (POST-TRAUMATIC STRESS DISORDER): Primary | ICD-10-CM

## 2021-10-26 DIAGNOSIS — R41.3 MEMORY LOSS: ICD-10-CM

## 2021-10-26 DIAGNOSIS — F43.10 PTSD (POST-TRAUMATIC STRESS DISORDER): ICD-10-CM

## 2021-10-26 DIAGNOSIS — F07.81 POST CONCUSSION SYNDROME: Primary | ICD-10-CM

## 2021-10-26 DIAGNOSIS — F06.4 ANXIETY DISORDER DUE TO MEDICAL CONDITION: ICD-10-CM

## 2021-10-26 PROCEDURE — 99214 OFFICE O/P EST MOD 30 MIN: CPT | Mod: 95 | Performed by: NURSE PRACTITIONER

## 2021-10-26 PROCEDURE — 90834 PSYTX W PT 45 MINUTES: CPT | Mod: 95 | Performed by: PSYCHOLOGIST
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
## 2021-10-27 ENCOUNTER — HOSPITAL ENCOUNTER (OUTPATIENT)
Dept: PHYSICAL THERAPY | Facility: CLINIC | Age: 36
Setting detail: THERAPIES SERIES
End: 2021-10-27
Attending: NURSE PRACTITIONER
Payer: COMMERCIAL

## 2021-10-27 PROCEDURE — 97530 THERAPEUTIC ACTIVITIES: CPT | Mod: GP | Performed by: PHYSICAL THERAPIST

## 2021-10-27 NOTE — DISCHARGE INSTRUCTIONS
10/27/21    THROAT Coat tea.  Herbal.  For voice.      Call to schedule OT - [phone]    Exercise:    Chin tucks    Shoulder blade squeezes    Ear to shoulder, holding chair (neck stretch)    Walk for exercise, daytime.  Walk with a friend if able.  Or alone.      Sound sensitivity.   Try the foam earplugs.     Calm chayo- ocean sounds.  Adult sleep stories - free one - Blue gold is a story.       Cathryn  -PT  I will cancel your PT on NOV. 10.   We can be done w/ PT today.   You will have more time for therapy and OT.    Date Of Previous Biopsy (Optional): 7/30/19

## 2021-10-27 NOTE — PROGRESS NOTES
Outpatient Physical Therapy Discharge Note     Patient: Yocasta Kohli  : 1985    Beginning/End Dates of Reporting Period:  21 to 10/27/21    Referring Provider: CATALINO De Luna NP    Therapy Diagnosis: post concussive     Client Self Report: will see psychologist re; my issues.      Objective Measurements:  Objective Measure: CSA   Details: 16  Objective Measure: FGA   Details:    Objective Measure: 25ft   Details: 6.4 sec.       Outcome Measures (most recent score):  Concussion Symptom Assessment (score out of 90). A higher score indicates greater impairment: 16    Goals:  Goal Identifier HEP   Goal Description Patient will demonstrate understanding and compliance to her HEP for continued wellbeing upon discharge from skilled physical therapy.   Target Date 21   Date Met  10/27/21   Progress (detail required for progress note):       Goal Identifier CSA   Goal Description Patient will complete the CSA with a score of <16 to demonstrate decreased overall symptoms for return to work and leisure activities without limitation.   Target Date 21   Date Met  10/27/21   Progress (detail required for progress note): 10/14/21 - csa score is 17 today.     Goal Identifier FGA   Goal Description Patient will complete the FGA with a score of 28/30 to demonstrate improved balance and decreased risk for falls.   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    Target Date 21   Date Met  10/14/21   Progress (detail required for progress note):         Will discharge patient    Discharge:  Yes    Reason for Discharge: Patient has met all goals.    Equipment Issued: none    Discharge Plan: Patient to continue home  program.

## 2021-10-27 NOTE — PROGRESS NOTES
10/27/21 1400   Signing Clinician's Name / Credentials   Signing clinician's name / credentials Cathryn Jay PT   Session Number   Session Number 5  CANDEMARIEL MA ([facility])   Goal 1   Goal Identifier HEP   Goal Description Patient will demonstrate understanding and compliance to her HEP for continued wellbeing upon discharge from skilled physical therapy.   Target Date 11/16/21   Date Met 10/27/21   Goal 2   Goal Identifier CSA   Goal Description Patient will complete the CSA with a score of <16 to demonstrate decreased overall symptoms for return to work and leisure activities without limitation.   Goal Progress 10/14/21 - csa score is 17 today.   Target Date 11/16/21   Date Met 10/27/21   Goal 3   Goal Identifier FGA   Goal Description Patient will complete the FGA with a score of 28/30 to demonstrate improved balance and decreased risk for falls.   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    Target Date 11/16/21   Date Met 10/14/21   Subjective Report   Subjective Report will see psychologist re; my issues.     Objective Measure 1   Objective Measure CSA    Details 16   Objective Measure 2   Objective Measure FGA    Details 30/30    Objective Measure 5   Objective Measure 25ft    Details 6.4 sec.    Treatment Interventions   Interventions Therapeutic Activity   Therapeutic Activity   Therapeutic Activities: dynamic activities to improve functional performance minutes ([id number]) 26   Skilled Intervention retested goals. education   Patient Response agrees   Treatment Detail see goals above.  see pt instructions and pt practiced exer neck sidebending, shoulder blade circles x 8 and chin tucks x 5 w/demo. rec she cont that.  educ to walk outside in daylight for exer.   educ re; sound machine or Calm chayo for over riding sound from neighbors. issued a pair of foam earplugs.  goals met;  gave pt OT scheduling number.    Education   Learner Patient   Readiness Acceptance   Method Booklet/handout;Explanation   Response Verbalizes Understanding;Demonstrates Understanding   Education Comments HEP, pt instructions   Plan   Homework above   Home program HEP: levator stretch, standing VOR (horizontal and vertical)    Updates to plan of care goals met   Plan for next session d/c   Total Session Time   Timed Code Treatment Minutes 26   Total Treatment Time (sum of timed and untimed services) 26

## 2021-11-02 ENCOUNTER — HOSPITAL ENCOUNTER (OUTPATIENT)
Dept: OCCUPATIONAL THERAPY | Facility: CLINIC | Age: 36
Setting detail: THERAPIES SERIES
End: 2021-11-02
Attending: NURSE PRACTITIONER
Payer: COMMERCIAL

## 2021-11-02 PROCEDURE — 97166 OT EVAL MOD COMPLEX 45 MIN: CPT | Mod: GO | Performed by: OCCUPATIONAL THERAPIST

## 2021-11-02 PROCEDURE — 97535 SELF CARE MNGMENT TRAINING: CPT | Mod: GO | Performed by: OCCUPATIONAL THERAPIST

## 2021-11-03 DIAGNOSIS — F07.81 POST CONCUSSION SYNDROME: ICD-10-CM

## 2021-11-03 DIAGNOSIS — H54.7 VISION PROBLEMS: Primary | ICD-10-CM

## 2021-11-03 NOTE — PROGRESS NOTES
[facility]          OUTPATIENT OCCUPATIONAL THERAPY  EVALUATION  PLAN OF TREATMENT FOR OUTPATIENT REHABILITATION  (COMPLETE FOR INITIAL CLAIMS ONLY)  Patient's Last Name, First Name, M.I.  YOB: 1985  Yocasta Kohli                        Provider's Name  [facility] Medical Record No.  [id number]                               Onset Date:     07/18/21   Start of Care Date:     11/02/21   Type:     ___PT   _X_OT   ___SLP Medical Diagnosis:     Post Concussion Syndrome and Memory Loss                          OT Diagnosis:     Impaired ADL/IADL and functional mobility Visits from SOC:  1   _________________________________________________________________________________  Plan of Treatment/Functional Goals:  ADL training, IADL training, Cognitive skills, Cognitive performance testing, Community/work reintegration, Coordination training, Self care/Home management, Therapeutic activities, Visual perception        Goals     Goal Description: Pt will demonstrate understanding of at least 2 methods to reduce visual strain and fatigue with visual tasks such as reading and near work  Target Date: 01/25/22        Goal Description: Pt will demonstrate understanding of at least 3 methods to compensate for reduced attention and memory to demonstrate follow through with daily tasks she needs to complete at 90% accuracy or >.  Target Date: 01/25/22        Goal Description: Pt will demonstrate improved management of concussion symptoms and sensory tolerance to demonstrate CSA total score of no greater than 10  Target Date: 01/25/22          Therapy Frequency: weekly x4 weeks and tapering to biweekly     Predicted Duration of Therapy Intervention (days/wks): up to 8 visits in 12 weeks 11/2/21-1/25/22  DOMINIK Duffy/ASHLEE SPICER CERTIFY THE NEED FOR THESE SERVICES FURNISHED  UNDER        THIS PLAN OF TREATMENT AND WHILE UNDER MY CARE     (Physician co-signature of this document indicates review and certification of the therapy plan).                Referring Physician: Krista WILLIAMSON CNP     Initial Assessment        See Epic Evaluation      Start Of Care Date: 11/02/21
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
## 2021-11-11 ENCOUNTER — HOSPITAL ENCOUNTER (OUTPATIENT)
Dept: OCCUPATIONAL THERAPY | Facility: CLINIC | Age: 36
Setting detail: THERAPIES SERIES
End: 2021-11-11
Attending: NURSE PRACTITIONER
Payer: COMMERCIAL

## 2021-11-11 PROCEDURE — 97535 SELF CARE MNGMENT TRAINING: CPT | Mod: GO | Performed by: OCCUPATIONAL THERAPIST

## 2021-11-12 NOTE — PROGRESS NOTES
Cognistat    SUMMARY OF TEST:    The Cognistat is designed to assess functioning in five major areas:  Language, Constructions, Memory, Calculations and Reasoning.  Attention, Level of Consciousness and Orientation are assessed independently.  Scores are plotted on a profile which illustrates the overall pattern of abilities and disabilities.  Scores can be compared to norms and/or representative profiles for various populations.    DATE OF TESTIN21    RESULTS OF TESTING:    This patient scores in the average range for Level of Consciousness, Orientation, Attention, Language, Constructions, Memory, Calculations and Reasoning    This patient scores in the mildly impaired for naming    This patient scores in the moderately impaired for NA    This patient scores in the severely impaired for NA    Test results comments:  Mild naming error of 1/4 parts of a pen    INTERPRETATION OF TEST RESULTS:    See above. Will plan for more in depth assessment of memory, attention and problem solving.    Factors affecting performance:  No additional problems noted    Recommendations:  Continue OT for higher level assessment related to memory as pt notes memory challenges    TIME ADMINISTERING TEST: see daily note    TIME FOR INTERPRETATION AND PREPARATION OF REPORT: see daily note    TOTAL TIME: see daily note

## 2021-11-15 ENCOUNTER — VIRTUAL VISIT (OUTPATIENT)
Dept: NEUROLOGY | Facility: CLINIC | Age: 36
End: 2021-11-15
Payer: COMMERCIAL

## 2021-11-15 DIAGNOSIS — F33.1 MAJOR DEPRESSIVE DISORDER, RECURRENT EPISODE, MODERATE (H): Primary | ICD-10-CM

## 2021-11-15 PROCEDURE — 90834 PSYTX W PT 45 MINUTES: CPT | Mod: 95 | Performed by: PSYCHOLOGIST
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
## 2021-12-06 ENCOUNTER — VIRTUAL VISIT (OUTPATIENT)
Dept: NEUROLOGY | Facility: CLINIC | Age: 36
End: 2021-12-06
Payer: COMMERCIAL

## 2021-12-06 DIAGNOSIS — F41.1 GAD (GENERALIZED ANXIETY DISORDER): Primary | ICD-10-CM

## 2021-12-06 PROCEDURE — 90834 PSYTX W PT 45 MINUTES: CPT | Mod: 95 | Performed by: PSYCHOLOGIST

## 2021-12-06 NOTE — LETTER
12/6/2021         RE: Yocasta Kohli  [address] [place] [address]        Dear Colleague,    Thank you for referring your patient, Yocasta Kohli, to the [facility]. Please see a copy of my visit note below.    Psychology Progress Note    Date: December 6, 2021    Time length and type of treatment: 42 minutes (3:02 PM - 3:44 PM), individual therapy    After review of the patient's situation, this visit was changed from an in-person visit to a  video visit via [employer] to reduce the risk of COVID 19 exposure. The video was switched to [employer] due to problems with connectivity, and the visit was finished with a telephone visit due to continued reception problems. Patient was informed that policies and procedures that govern in-person sessions would also apply to  video sessions. Patient was also informed that  video sessions would be discontinued when COVID 19 exposure is no longer a concern (as determined by [facility]).     Patient location: Patient home in [place], [place]  Provider location:  [facility] - [facility], [place], [place]    Patient was in agreement with proceeding with a  video session.      Necessity: This session is necessary to address the patient's PTSD, depression, and anxiety.  Today we focus on the patient's treatment plan, specifically exploring sleep hygiene.  The reader is invited to review the patient's full treatment plan in the Media section of the patient's Epic medical record.    Psychotherapeutic Technique: This writer utilized motivational interviewing, active listening, reassurance and support in the context of cognitive behavioral therapy to address the above.      MENTAL STATUS EVALUATION  Grooming: Within normal limits  Attire: Appropriate  Age: Appears Stated  Behavior Towards Examiner: Cooperative  Motor Activity: Within normal limits  Eye Contact: Appropriate  Mood: Anxious    Affect: full range  Speech/Language: normal  Attention: Easily distracted  Concentration: Distracted  Thought Process: tangential  Thought Content: Does not seem to be responding to internal stimuli   Orientation: Appeared oriented to person, place, and time, though not formally established  Memory: No evidence of impairment.  Judgement: Adequate  Estimated Intelligence: Average  Demonstrated Insight: Adequate  Fund of Knowledge: Adequate    Intervention:  Patient had questions about her diagnoses and these were reviewed. She reported significant problems with sleep, noting that ruminations kept her awake and she went several days with only about 5 hours of sleep per night.  Strategies for managing ruminations were reviewed and patient identified reciting the lyrics to Blackbird by the Beatles as something she could do to help her fall asleep.  Patient was unusually distracted and connectivity problems interfered with the video visit.     Progress:  Patient was able to identify a strategy she believed would facilitate improved sleep.      Plan:   We will meet again in 2 weeks to address the patient's PTSD, depression, and anxiety.  Estimated duration of treatment is 10+ individual therapy sessions (36150) at twice monthly intervals. Treatment is expected to be completed by October 2022.       Diagnosis:  Generalized Anxiety Disorder  Major Depressive Disorder, recurrent, moderate  Posttraumatic Stress Disorder (PTSD)      Again, thank you for allowing me to participate in the care of your patient.        Sincerely,        Rut Camara Psy.D, LP

## 2021-12-06 NOTE — PROGRESS NOTES
Psychology Progress Note    Date: December 6, 2021    Time length and type of treatment: 42 minutes (3:02 PM - 3:44 PM), individual therapy    After review of the patient's situation, this visit was changed from an in-person visit to a  video visit via [employer] to reduce the risk of COVID 19 exposure. The video was switched to [employer] due to problems with connectivity, and the visit was finished with a telephone visit due to continued reception problems. Patient was informed that policies and procedures that govern in-person sessions would also apply to  video sessions. Patient was also informed that  video sessions would be discontinued when COVID 19 exposure is no longer a concern (as determined by [facility]).     Patient location: Patient home in [place], [place]  Provider location:  [facility], [place], [place]    Patient was in agreement with proceeding with a  video session.      Necessity: This session is necessary to address the patient's PTSD, depression, and anxiety.  Today we focus on the patient's treatment plan, specifically exploring sleep hygiene.  The reader is invited to review the patient's full treatment plan in the Media section of the patient's Epic medical record.    Psychotherapeutic Technique: This writer utilized motivational interviewing, active listening, reassurance and support in the context of cognitive behavioral therapy to address the above.      MENTAL STATUS EVALUATION  Grooming: Within normal limits  Attire: Appropriate  Age: Appears Stated  Behavior Towards Examiner: Cooperative  Motor Activity: Within normal limits  Eye Contact: Appropriate  Mood: Anxious   Affect: full range  Speech/Language: normal  Attention: Easily distracted  Concentration: Distracted  Thought Process: tangential  Thought Content: Does not seem to be responding to internal stimuli   Orientation: Appeared oriented to person, place, and time, though not formally  established  Memory: No evidence of impairment.  Judgement: Adequate  Estimated Intelligence: Average  Demonstrated Insight: Adequate  Fund of Knowledge: Adequate    Intervention:  Patient had questions about her diagnoses and these were reviewed. She reported significant problems with sleep, noting that ruminations kept her awake and she went several days with only about 5 hours of sleep per night.  Strategies for managing ruminations were reviewed and patient identified reciting the lyrics to Blackbird by the Beatles as something she could do to help her fall asleep.  Patient was unusually distracted and connectivity problems interfered with the video visit.     Progress:  Patient was able to identify a strategy she believed would facilitate improved sleep.      Plan:   We will meet again in 2 weeks to address the patient's PTSD, depression, and anxiety.  Estimated duration of treatment is 10+ individual therapy sessions (10387) at twice monthly intervals. Treatment is expected to be completed by October 2022.       Diagnosis:  Generalized Anxiety Disorder  Major Depressive Disorder, recurrent, moderate  Posttraumatic Stress Disorder (PTSD)

## 2021-12-09 ENCOUNTER — HOSPITAL ENCOUNTER (OUTPATIENT)
Dept: OCCUPATIONAL THERAPY | Facility: CLINIC | Age: 36
Setting detail: THERAPIES SERIES
End: 2021-12-09
Attending: NURSE PRACTITIONER
Payer: COMMERCIAL

## 2021-12-09 PROCEDURE — 97530 THERAPEUTIC ACTIVITIES: CPT | Mod: GO | Performed by: OCCUPATIONAL THERAPIST

## 2021-12-09 PROCEDURE — 97535 SELF CARE MNGMENT TRAINING: CPT | Mod: GO | Performed by: OCCUPATIONAL THERAPIST

## 2021-12-20 ENCOUNTER — VIRTUAL VISIT (OUTPATIENT)
Dept: NEUROLOGY | Facility: CLINIC | Age: 36
End: 2021-12-20
Payer: COMMERCIAL

## 2021-12-20 DIAGNOSIS — F41.1 GAD (GENERALIZED ANXIETY DISORDER): Primary | ICD-10-CM

## 2021-12-20 PROCEDURE — 90834 PSYTX W PT 45 MINUTES: CPT | Mod: 95 | Performed by: PSYCHOLOGIST

## 2021-12-20 NOTE — LETTER
12/20/2021         RE: Yocasta Kohli  [address] [place] [address]        Dear Colleague,    Thank you for referring your patient, Yocasta Kohli, to the [facility]. Please see a copy of my visit note below.    Psychology Progress Note    Date: December 20, 2021    Time length and type of treatment: 43 minutes (1:04 PM - 1:47 PM), individual therapy    After review of the patient's situation, this visit was changed from an in-person visit to a video visit via [employer] to reduce the risk of COVID 19 exposure. Patient was informed that policies and procedures that govern in-person sessions would also apply to video sessions. Patient was also informed that video sessions would be discontinued when COVID 19 exposure is no longer a concern (as determined by [facility]).     Patient location: Patient home in [place], [place]  Provider location:  [facility], [place], [place]    Patient was in agreement with proceeding with a video session.      Necessity: This session is necessary to address the patient's anxiety, depression, and PTSD.  Today we focus on the patient's treatment plan, specifically exploring thoughts and expectations of self and others. The reader is invited to review the patient's full treatment plan in the Media section of the patient's Epic medical record.    Psychotherapeutic Technique: This writer utilized motivational interviewing, active listening, reassurance and support in the context of cognitive behavioral therapy to address the above.      MENTAL STATUS EVALUATION  Grooming: Within normal limits  Attire: Appropriate  Age: Appears Stated  Behavior Towards Examiner: Cooperative  Motor Activity: Within normal limits  Eye Contact: Appropriate  Mood: Sad   Affect: full range  Speech/Language: normal  Attention: Normal  Concentration: Sufficient  Thought Process: unremarkable  Thought  "Content: Clear    Orientation: Appeared oriented to person, place, and time, though not formally established  Memory: No evidence of impairment.  Judgement: Adequate  Estimated Intelligence: Average  Demonstrated Insight: Adequate  Fund of Knowledge: Adequate    Intervention:   Patient discussed ambivalent feelings over calling her boyfriend's phone late at night and having another woman answer. When patient asked her who she was, she stated she was \"Joao's girl.\"  Patient has discussed this with Joao who stated the woman was a random person from the bar who was messing with her, but the call was after bars closed.  Patient discussed complex feelings, including wanting to believe Joao, not trusting Joao, and not wanting to have a relationship end right before [place], especially with her parents both out of the country. Patient ambivalence was highlighted. Patient has begun working on her resume and expressed pride that she is making progress. She identified types of jobs she hopes to find and places she intends to contact.  This positive progress was reinforced and validated.    Progress:  Patient has made progress with updating her resume, which has been reinforcing.    Plan:   We will meet again in 2 weeks to address the patient's PTSD, depression, and anxiety.  Estimated duration of treatment is 10+ individual therapy sessions (41967) at twice monthly intervals. Treatment is expected to be completed by October 22.     Diagnosis:  Generalized Anxiety Disorder  Major Depressive Disorder, recurrent, moderate  Posttraumatic Stress Disorder (PTSD)      Again, thank you for allowing me to participate in the care of your patient.        Sincerely,        Rut Camara Psy.D, LP    "
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
## 2021-12-23 ENCOUNTER — HOSPITAL ENCOUNTER (OUTPATIENT)
Dept: OCCUPATIONAL THERAPY | Facility: CLINIC | Age: 36
Setting detail: THERAPIES SERIES
End: 2021-12-23
Attending: NURSE PRACTITIONER
Payer: COMMERCIAL

## 2021-12-23 PROCEDURE — 97129 THER IVNTJ 1ST 15 MIN: CPT | Mod: GO | Performed by: OCCUPATIONAL THERAPIST

## 2021-12-23 PROCEDURE — 97535 SELF CARE MNGMENT TRAINING: CPT | Mod: GO | Performed by: OCCUPATIONAL THERAPIST

## 2022-01-03 ENCOUNTER — VIRTUAL VISIT (OUTPATIENT)
Dept: NEUROLOGY | Facility: CLINIC | Age: 37
End: 2022-01-03
Payer: COMMERCIAL

## 2022-01-03 DIAGNOSIS — F41.1 GAD (GENERALIZED ANXIETY DISORDER): Primary | ICD-10-CM

## 2022-01-03 PROCEDURE — 90834 PSYTX W PT 45 MINUTES: CPT | Mod: 95 | Performed by: PSYCHOLOGIST

## 2022-01-03 NOTE — PROGRESS NOTES
Psychology Progress Note    Date: January 3, 2022    Time length and type of treatment: 44 minutes (1:00 PM to 1:44 PM), individual therapy    After review of the patient's situation, this visit was changed from an in-person visit to a  video visit via "[employer] to reduce the risk of COVID 19 exposure. Patient was informed that policies and procedures that govern in-person sessions would also apply to  video sessions. Patient was also informed that  video sessions would be discontinued when COVID 19 exposure is no longer a concern (as determined by [facility]).     Patient location: Patient home in [place], [place]  Provider location:  [facility], [place], [place]    Patient was in agreement with proceeding with a  video session.      Necessity: This session is necessary to address the patient's anxiety, depression, and PTSD.  Today we focus on the patient's treatment plan, specifically exploring coping strategies, and thoughts and expectations of self and others.  The reader is invited to review the patient's full treatment plan in the Media section of the patient's Epic medical record.    Psychotherapeutic Technique: This writer utilized motivational interviewing, active listening, reassurance and support in the context of cognitive behavioral therapy to address the above.      MENTAL STATUS EVALUATION  Grooming: Within normal limits  Attire: Appropriate  Age: Appears Stated  Behavior Towards Examiner: Cooperative  Motor Activity: Within normal limits  Eye Contact: Appropriate  Mood: Anxious  Sad   Affect: full range  Speech/Language: Mildly pressured  Attention: Normal  Concentration: Sufficient  Thought Process: tangential  Thought Content: Clear    Orientation: Appeared oriented to person, place, and time, though not formally established  Memory: No evidence of impairment.  Judgement: Adequate  Estimated Intelligence: Average  Demonstrated Insight: Adequate  Fund of  Knowledge: Adequate    Intervention:   Patient reported that she spent New Year's Even with her best friend after Joao said he wasn't doing anything. Patient later tried to call him and his phone was off.  Patient questioned Joao's truthfulness and reported she is growing closer to being ready to end this relationship. She noted that both his birthday and her own are in January, and the woman he cheated on her with will have her baby soon.  Once these events are over, she feels she will be ready to move on.  Patient also discussed her PTSD and efforts she has made to ride the light rail.  Patient was provided psychoeducation related to exposure, and positive progress was reinforced.  Patient discussed how low self esteem and self doubt can interfere with positive progress and a strategy to help with managing critical voices was explained.  Patient reiterated her commitment to finishing her resume and applying for jobs. Patient asked about the possibility of an antidepressant and was encouraged to discuss this with her prescriber.    Progress:  Patient has shown improvement in ability to utilize coping skills, and renewed commitment toward personal growth.     Plan:   We will meet again in 2 weeks to address the patient's PTSD, depression, and anxiety.  Estimated duration of treatment is 10+ individual therapy sessions (92135) at twice monthly intervals. Treatment is expected to be completed by October, 2022.     Diagnosis:  Generalized Anxiety Disorder  Major Depressive Disorder, recurrent, moderate  Posttraumatic Stress Disorder (PTSD)

## 2022-01-03 NOTE — LETTER
1/3/2022         RE: Yocasta Kohli  [address] [place] [address]        Dear Colleague,    Thank you for referring your patient, Yocasta Kohli, to the [facility]. Please see a copy of my visit note below.    Psychology Progress Note    Date: January 3, 2022    Time length and type of treatment: 44 minutes (1:00 PM to 1:44 PM), individual therapy    After review of the patient's situation, this visit was changed from an in-person visit to a  video visit via [employer] to reduce the risk of COVID 19 exposure. Patient was informed that policies and procedures that govern in-person sessions would also apply to  video sessions. Patient was also informed that  video sessions would be discontinued when COVID 19 exposure is no longer a concern (as determined by [facility]).     Patient location: Patient home in [place], [place]  Provider location:  [facility], [place], [place]    Patient was in agreement with proceeding with a  video session.      Necessity: This session is necessary to address the patient's anxiety, depression, and PTSD.  Today we focus on the patient's treatment plan, specifically exploring coping strategies, and thoughts and expectations of self and others.  The reader is invited to review the patient's full treatment plan in the Media section of the patient's Epic medical record.    Psychotherapeutic Technique: This writer utilized motivational interviewing, active listening, reassurance and support in the context of cognitive behavioral therapy to address the above.      MENTAL STATUS EVALUATION  Grooming: Within normal limits  Attire: Appropriate  Age: Appears Stated  Behavior Towards Examiner: Cooperative  Motor Activity: Within normal limits  Eye Contact: Appropriate  Mood: Anxious  Sad   Affect: full range  Speech/Language: Mildly pressured  Attention: Normal  Concentration:  Sufficient  Thought Process: tangential  Thought Content: Clear    Orientation: Appeared oriented to person, place, and time, though not formally established  Memory: No evidence of impairment.  Judgement: Adequate  Estimated Intelligence: Average  Demonstrated Insight: Adequate  Fund of Knowledge: Adequate    Intervention:   Patient reported that she spent New Year's Even with her best friend after Joao said he wasn't doing anything. Patient later tried to call him and his phone was off.  Patient questioned Joao's truthfulness and reported she is growing closer to being ready to end this relationship. She noted that both his birthday and her own are in January, and the woman he cheated on her with will have her baby soon.  Once these events are over, she feels she will be ready to move on.  Patient also discussed her PTSD and efforts she has made to ride the light rail.  Patient was provided psychoeducation related to exposure, and positive progress was reinforced.  Patient discussed how low self esteem and self doubt can interfere with positive progress and a strategy to help with managing critical voices was explained.  Patient reiterated her commitment to finishing her resume and applying for jobs. Patient asked about the possibility of an antidepressant and was encouraged to discuss this with her prescriber.    Progress:  Patient has shown improvement in ability to utilize coping skills, and renewed commitment toward personal growth.     Plan:   We will meet again in 2 weeks to address the patient's PTSD, depression, and anxiety.  Estimated duration of treatment is 10+ individual therapy sessions (46339) at twice monthly intervals. Treatment is expected to be completed by October, 2022.     Diagnosis:  Generalized Anxiety Disorder  Major Depressive Disorder, recurrent, moderate  Posttraumatic Stress Disorder (PTSD)      Again, thank you for allowing me to participate in the care of your patient.         Sincerely,        Rut Camara Psy.D, LP
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
## 2022-01-17 ENCOUNTER — VIRTUAL VISIT (OUTPATIENT)
Dept: NEUROLOGY | Facility: CLINIC | Age: 37
End: 2022-01-17
Payer: COMMERCIAL

## 2022-01-17 DIAGNOSIS — F33.1 MAJOR DEPRESSIVE DISORDER, RECURRENT EPISODE, MODERATE (H): Primary | ICD-10-CM

## 2022-01-17 PROCEDURE — 90834 PSYTX W PT 45 MINUTES: CPT | Mod: 95 | Performed by: PSYCHOLOGIST

## 2022-01-17 NOTE — LETTER
1/17/2022         RE: Yocasta Kohli  [address] [place] [address]        Dear Colleague,    Thank you for referring your patient, Yocasta Kohli, to the [facility]. Please see a copy of my visit note below.    Psychology Progress Note    Date: January 17, 2022    Time length and type of treatment: 42 minutes (12:59 PM to 1:41 PM), individual therapy    After review of the patient's situation, this visit was changed from an in-person visit to a  video visit via [employer] to reduce the risk of COVID 19 exposure. Patient was informed that policies and procedures that govern in-person sessions would also apply to  video sessions. Patient was also informed that  video sessions would be discontinued when COVID 19 exposure is no longer a concern (as determined by [facility]).     Patient location: Patient home in [place], [place]  Provider location:  [facility], [place], [place]    Patient was in agreement with proceeding with a  video session.      Necessity: This session is necessary to address the patient's anxiety, depression, and PTSD.  Today we focus on the patient's treatment plan, specifically exploring sleep hygiene and thoughts and expectations of self and others.  The reader is invited to review the patient's full treatment plan in the Media section of the patient's Epic medical record.    Psychotherapeutic Technique: This writer utilized motivational interviewing, active listening, reassurance and support in the context of cognitive behavioral therapy to address the above.      MENTAL STATUS EVALUATION  Grooming: Within normal limits  Attire: Appropriate  Age: Appears Stated  Behavior Towards Examiner: Cooperative  Motor Activity: Within normal limits  Eye Contact: Ranged from avoidant to appropriate  Mood: Depressed   Affect: full range  Speech/Language: normal  Attention: Easily  "distracted  Concentration: Sufficient  Thought Process: tangential  Thought Content: Clear    Orientation: Appeared oriented to person, place, and time, though not formally established  Memory: No evidence of impairment.  Judgement: Adequate  Estimated Intelligence: Average  Demonstrated Insight: Adequate  Fund of Knowledge: Adequate    Intervention:  Patient discussed her disrupted sleep schedule, and barriers to establishing a more regular schedule, especially patient perception that from midnight to 2:00 or 3:00 am is \"her\" time when she can do whatever she wants without being interrupted or feeling like she \"should\" be doing something productive.  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 Motivational Interviewing was utilized to support further steps to support gainful employment.      Progress:  Patient continues to grapple with ambivalence over job hunting and her romantic relationship.    Plan:   We will meet again in 2 weeks to address the patient's PTSD, depression, and anxiety.  Estimated duration of treatment is 10+ individual therapy sessions (21131) at twice monthly intervals. Treatment is expected to be completed by October 2022.     Diagnosis:  Major Depressive Disorder, recurrent, moderate  Generalized Anxiety Disorder  Posttraumatic Stress Disorder (PTSD)      Again, thank you for allowing me to participate in the care of your patient.        Sincerely,        Rut Camara Psy.D, LP    "

## 2022-01-17 NOTE — PROGRESS NOTES
Psychology Progress Note    Date: January 17, 2022    Time length and type of treatment: 42 minutes (12:59 PM to 1:41 PM), individual therapy    After review of the patient's situation, this visit was changed from an in-person visit to a  video visit via [employer] to reduce the risk of COVID 19 exposure. Patient was informed that policies and procedures that govern in-person sessions would also apply to  video sessions. Patient was also informed that  video sessions would be discontinued when COVID 19 exposure is no longer a concern (as determined by [facility]).     Patient location: Patient home in [place], [place]  Provider location:  [facility], [place], [place]    Patient was in agreement with proceeding with a  video session.      Necessity: This session is necessary to address the patient's anxiety, depression, and PTSD.  Today we focus on the patient's treatment plan, specifically exploring sleep hygiene and thoughts and expectations of self and others.  The reader is invited to review the patient's full treatment plan in the Media section of the patient's Epic medical record.    Psychotherapeutic Technique: This writer utilized motivational interviewing, active listening, reassurance and support in the context of cognitive behavioral therapy to address the above.      MENTAL STATUS EVALUATION  Grooming: Within normal limits  Attire: Appropriate  Age: Appears Stated  Behavior Towards Examiner: Cooperative  Motor Activity: Within normal limits  Eye Contact: Ranged from avoidant to appropriate  Mood: Depressed   Affect: full range  Speech/Language: normal  Attention: Easily distracted  Concentration: Sufficient  Thought Process: tangential  Thought Content: Clear    Orientation: Appeared oriented to person, place, and time, though not formally established  Memory: No evidence of impairment.  Judgement: Adequate  Estimated Intelligence: Average  Demonstrated Insight:  "Adequate  Fund of Knowledge: Adequate    Intervention:  Patient discussed her disrupted sleep schedule, and barriers to establishing a more regular schedule, especially patient perception that from midnight to 2:00 or 3:00 am is \"her\" time when she can do whatever she wants without being interrupted or feeling like she \"should\" be doing something productive.  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 Motivational Interviewing was utilized to support further steps to support gainful employment.      Progress:  Patient continues to grapple with ambivalence over job hunting and her romantic relationship.    Plan:   We will meet again in 2 weeks to address the patient's PTSD, depression, and anxiety.  Estimated duration of treatment is 10+ individual therapy sessions (78746) at twice monthly intervals. Treatment is expected to be completed by October 2022.     Diagnosis:  Major Depressive Disorder, recurrent, moderate  Generalized Anxiety Disorder  Posttraumatic Stress Disorder (PTSD)  "

## 2022-01-26 ENCOUNTER — OFFICE VISIT (OUTPATIENT)
Dept: OPHTHALMOLOGY | Facility: CLINIC | Age: 37
End: 2022-01-26
Payer: COMMERCIAL

## 2022-01-26 DIAGNOSIS — H53.143 PHOTOPHOBIA OF BOTH EYES: ICD-10-CM

## 2022-01-26 DIAGNOSIS — H04.123 DRY EYE SYNDROME OF BILATERAL LACRIMAL GLANDS: ICD-10-CM

## 2022-01-26 DIAGNOSIS — H52.13 MYOPIA OF BOTH EYES: ICD-10-CM

## 2022-01-26 DIAGNOSIS — F07.81 POSTCONCUSSION SYNDROME: Primary | ICD-10-CM

## 2022-01-26 PROCEDURE — 92015 DETERMINE REFRACTIVE STATE: CPT | Performed by: OPTOMETRIST

## 2022-01-26 PROCEDURE — 92004 COMPRE OPH EXAM NEW PT 1/>: CPT | Performed by: OPTOMETRIST

## 2022-01-26 RX ORDER — CARBOXYMETHYLCELLULOSE SODIUM 5 MG/ML
1 SOLUTION/ DROPS OPHTHALMIC 3 TIMES DAILY PRN
Qty: 15 ML | Refills: 11 | Status: SHIPPED | OUTPATIENT
Start: 2022-01-26 | End: 2023-08-11

## 2022-01-26 ASSESSMENT — CUP TO DISC RATIO
OS_RATIO: 0.2
OD_RATIO: 0.2

## 2022-01-26 ASSESSMENT — REFRACTION_MANIFEST
OS_CYLINDER: +0.25
OD_CYLINDER: SPHERE
OS_AXIS: 075
OS_SPHERE: -0.50
OD_SPHERE: -0.50

## 2022-01-26 ASSESSMENT — EXTERNAL EXAM - RIGHT EYE: OD_EXAM: NORMAL

## 2022-01-26 ASSESSMENT — VISUAL ACUITY
METHOD: SNELLEN - LINEAR
OD_SC: 20/20
OS_SC: 20/20
OD_SC+: -1

## 2022-01-26 ASSESSMENT — TONOMETRY
IOP_METHOD: ICARE
OS_IOP_MMHG: 19
OD_IOP_MMHG: 20

## 2022-01-26 ASSESSMENT — CONF VISUAL FIELD
METHOD: COUNTING FINGERS
OS_NORMAL: 1
OD_NORMAL: 1

## 2022-01-26 ASSESSMENT — EXTERNAL EXAM - LEFT EYE: OS_EXAM: NORMAL

## 2022-01-26 ASSESSMENT — SLIT LAMP EXAM - LIDS
COMMENTS: NORMAL
COMMENTS: NORMAL

## 2022-01-26 NOTE — PROGRESS NOTES
Assessment/Plan  (F07.81) Postconcussion syndrome  (primary encounter diagnosis)  Comment: Injured June 2021  Plan: Discussed findings with patient. Ocular health and binocular vision appear to be basically within normal limits. Ongoing photosensitive should improve with FL-41 tinted glasses and continued re-introduction of normal lights. New glasses should slightly improve visual acuity, however patient does very well on her own without corrective lenses. Return to clinic as needed if symptoms worsen or fail to improve.     (H53.143) Photophobia of both eyes  Comment: Patient appears to have likely dark adapted  Plan: See above note.     (H04.123) Dry eye syndrome of bilateral lacrimal glands  Plan: carboxymethylcellulose (CARBOXYMETHYLCELLULOSE         SODIUM) 0.5 % SOLN ophthalmic solution        Begin using lubricating drops 3 times daily as needed for dryness. Patient requested Rx be sent to her pharmacy.     (H52.13) Myopia of both eyes  Plan: REFRACTION [[id number]]        Discussed findings with patient. New spectacle prescription dispensed to patient. Patient is welcome to return to clinic with prolonged adaptation difficulties.     Complete documentation of historical and exam elements from today's encounter can  be found in the full encounter summary report (not reduplicated in this progress  note). I personally obtained the chief complaint(s) and history of present illness. I  confirmed and edited as necessary the review of systems, past medical/surgical  history, family history, social history, and examination findings as documented by  others; and I examined the patient myself. I personally reviewed the relevant tests,  images, and reports as documented above. I formulated and edited as necessary the  assessment and plan and discussed the findings and management plan with the  patient and family.    Will Hernandez OD

## 2022-01-26 NOTE — LETTER
1/26/2022       RE: Yocasta Kohli  [address] [place] [address]     Dear Colleague,    Thank you for referring your patient, Yocasta Kohli, to the [facility] [place] [facility]. Please see a copy of my visit note below.    Assessment/Plan  (F07.81) Postconcussion syndrome  (primary encounter diagnosis)  Comment: Injured June 2021  Plan: Discussed findings with patient. Ocular health and binocular vision appear to be basically within normal limits. Ongoing photosensitive should improve with FL-41 tinted glasses and continued re-introduction of normal lights. New glasses should slightly improve visual acuity, however patient does very well on her own without corrective lenses. Return to clinic as needed if symptoms worsen or fail to improve.     (H53.143) Photophobia of both eyes  Comment: Patient appears to have likely dark adapted  Plan: See above note.     (H04.123) Dry eye syndrome of bilateral lacrimal glands  Plan: carboxymethylcellulose (CARBOXYMETHYLCELLULOSE         SODIUM) 0.5 % SOLN ophthalmic solution        Begin using lubricating drops 3 times daily as needed for dryness. Patient requested Rx be sent to her pharmacy.     (H52.13) Myopia of both eyes  Plan: REFRACTION [[id number]]        Discussed findings with patient. New spectacle prescription dispensed to patient. Patient is welcome to return to clinic with prolonged adaptation difficulties.     Complete documentation of historical and exam elements from today's encounter can  be found in the full encounter summary report (not reduplicated in this progress  note). I personally obtained the chief complaint(s) and history of present illness. I  confirmed and edited as necessary the review of systems, past medical/surgical  history, family history, social history, and examination findings as documented by  others; and I examined the patient  myself. I personally reviewed the relevant tests,  images, and reports as documented above. I formulated and edited as necessary the  assessment and plan and discussed the findings and management plan with the  patient and family.    Will Hernandez OD

## 2022-01-27 NOTE — PROGRESS NOTES
[facility]    OUTPATIENT OCCUPATIONAL THERAPY  PLAN OF TREATMENT FOR OUTPATIENT REHABILITATION AND PROGRESS NOTE    Patient's Last Name, First Name, Yocasta Alonso Date of Birth  1985   Provider's Name  [facility] Medical Record No.  [id number]    Onset Date  7/18/21 Start of Care Date  11/2/21   Type:     __PT   _X_OT   __SLP Medical Diagnosis  Post Concussion Syndrome and Memory Loss      OT Diagnosis  Impaired ADL/IADL and functional mobility Plan of Treatment  Frequency/Duration: up to 4 visits in 12 weeks  Certification date from 1/26/22 to 4/19/22     Goals:    Goal Identifier     Goal Description Pt will demonstrate understanding of at least 2 methods to reduce visual strain and fatigue with visual tasks such as reading and near work   Target Date 01/25/22; extend to 4/19/22   Date Met      Progress (detail required for progress note):  Found Fl 41 tint lens helpful in reducing photophobia but hasn't purchased as awaiting optometry exam     Goal Identifier     Goal Description Pt will demonstrate understanding of at least 3 methods to compensate for reduced attention and memory to demonstrate follow through with daily tasks she needs to complete at 90% accuracy or >.   Target Date 01/25/22; extend to 4/19/22   Date Met      Progress (detail required for progress note):  Cognitive assessment completed and goal needs to be further addressed     Goal Identifier     Goal Description Pt will demonstrate improved management of concussion symptoms and sensory tolerance to demonstrate CSA total score of no greater than 10   Target Date 01/25/22; extend to 4/19/22   Date Met      Progress (detail required for progress note):  Partially met with CSA score improved to 9 on last visit but ongoing sensory intolerance for noise       Beginning/End Dates of  Progress Note Reporting Period:  21 to 22    Progress Toward Goals:   Progress this reporting period: Pt has been seen x 4 visits in OT this progress note period. She did not show up for her last 2 OT visits. Pt was making progress in therapy with total CSA score reduced to 9 on last visit \\ compared to 16/90 on initial OT visit 21. Pt has made progress with visual processing speed. Convergence, saccades and visual tracking have all improved also. See separate report for cognitive assessment that was completed. Min impairment noted with complex problem solving. Remains appropriate for ongoing OT to see for remaining sessions and address ongoing goals    Client Self (Subjective) Report for Progress Note Reporting Period: R side of head has pressure and sneezing last few days. Took bus here and very noisy with sounds of trucks at bus stop and she thinks this contributed to head pressure she is feeling.      Objective Measurements:      Objective Measure: Concussion Symptom Assessment (CSA)-see separate flowsheet   Details: total symptoms: 8; total score:9/90-higher scores = higher degree of symptom rating           Objective Measure: [facility] Sensory Scales (BBCSS)   Details: Auditory Processin/= 43% rates 3/9 auditory hypersensitivity items as frequently/often occurring                   I CERTIFY THE NEED FOR THESE SERVICES FURNISHED UNDER        THIS PLAN OF TREATMENT AND WHILE UNDER MY CARE     (Physician co-signature of this document indicates review and certification of the therapy plan).                Referring Provider: Krista WILLIAMSON, LEONARDO Hurt, OTR/L

## 2022-01-28 ENCOUNTER — TELEPHONE (OUTPATIENT)
Dept: PHYSICAL THERAPY | Facility: CLINIC | Age: 37
End: 2022-01-28
Payer: COMMERCIAL

## 2022-01-31 ENCOUNTER — VIRTUAL VISIT (OUTPATIENT)
Dept: NEUROLOGY | Facility: CLINIC | Age: 37
End: 2022-01-31
Payer: COMMERCIAL

## 2022-01-31 DIAGNOSIS — F33.0 MAJOR DEPRESSIVE DISORDER, RECURRENT EPISODE, MILD (H): Primary | ICD-10-CM

## 2022-01-31 PROCEDURE — 90834 PSYTX W PT 45 MINUTES: CPT | Mod: 95 | Performed by: PSYCHOLOGIST

## 2022-01-31 NOTE — LETTER
1/31/2022         RE: Yocasta Kohli  [address] [place] [address]        Dear Colleague,    Thank you for referring your patient, Yocasta Kohli, to the [facility]. Please see a copy of my visit note below.    Psychology Progress Note    Date: January 31, 2022    Time length and type of treatment: 45 minutes (1:02 PM to 1:47 PM), individual therapy    After review of the patient's situation, this visit was changed from an in-person visit to a  video visit via [employer] to reduce the risk of COVID 19 exposure. Patient was informed that policies and procedures that govern in-person sessions would also apply to  video sessions. Patient was also informed that  video sessions would be discontinued when COVID 19 exposure is no longer a concern (as determined by [facility]).     Patient location: Patient home in [place], [place]  Provider location:  [facility], [place], [place]    Patient was in agreement with proceeding with a  video session.      Necessity: This session is necessary to address the patient's anxiety, depression, and PTSD.  Today we focus on revising the patient's treatment plan.  The reader is invited to review the patient's full treatment plan in the Media section of the patient's Epic medical record.    Psychotherapeutic Technique: This writer utilized motivational interviewing, active listening, reassurance and support in the context of cognitive behavioral therapy to address the above.      MENTAL STATUS EVALUATION  Grooming: Well-groomed  Attire: Appropriate  Age: Appears Stated  Behavior Towards Examiner: Cooperative  Motor Activity: Within normal limits  Eye Contact: Appropriate  Mood: Sad   Affect: full range  Speech/Language: normal  Attention: Easily distracted  Concentration: Sufficient  Thought Process: tangential  Thought Content: Clear    Orientation: Appeared oriented to person,  place, and time, though not formally established  Memory: No evidence of impairment.  Judgement: Adequate  Estimated Intelligence: Above Average  Demonstrated Insight: Adequate  Fund of Knowledge: Adequate    Intervention:   Patient was readministered the PHQ-9, JAH-7, and PCL-5 as part of revising her treatment plan.  Her score of 9 on the PHQ-9 is suggestive of mild depression and is an improvement over her earlier score of 15.  Her score of 6 on the JAH-7 remains suggestive of mild anxiety and is similar to her earlier score.  Patient score of 29 on the PCL-5 is much improved over her previous score of 42, but patient opined that much of this improvement is attributable to her only having left her apartment a couple of times in January, and as a result, not being exposed to triggers.  Patient reported continued hypervigilance, and exaggerated startle, trouble concentrating, and sleep disturbance.  We agreed to maintain her PTSD diagnosis despite the apparent improvement on the PCL-5.  Patient's depression diagnosis was revised to MDD, recurrent, mild.  We agreed to meet once every 3 weeks going forward.  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     Progress:  Patient treatment plan was jointly revised.    Plan:   We will meet again in 3 weeks to address the patient's depression, anxiety, and PTSD.  Estimated duration of treatment is 10+ individual therapy sessions (50830) at intervals of once every 3 weeks. Treatment is expected to be completed by October 2022.     Diagnosis:  Major Depressive Disorder, recurrent, mild  Generalized Anxiety Disorder  Posttraumatic Stress Disorder (PTSD)      Again, thank you for allowing me to participate in the care of your patient.        Sincerely,        Rut Camara Psy.D, LP

## 2022-01-31 NOTE — PROGRESS NOTES
Psychology Progress Note    Date: January 31, 2022    Time length and type of treatment: 45 minutes (1:02 PM to 1:47 PM), individual therapy    After review of the patient's situation, this visit was changed from an in-person visit to a  video visit via [employer] to reduce the risk of COVID 19 exposure. Patient was informed that policies and procedures that govern in-person sessions would also apply to  video sessions. Patient was also informed that  video sessions would be discontinued when COVID 19 exposure is no longer a concern (as determined by [facility]).     Patient location: Patient home in [place], [place]  Provider location:  [facility], [place], [place]    Patient was in agreement with proceeding with a  video session.      Necessity: This session is necessary to address the patient's anxiety, depression, and PTSD.  Today we focus on revising the patient's treatment plan.  The reader is invited to review the patient's full treatment plan in the Media section of the patient's Epic medical record.    Psychotherapeutic Technique: This writer utilized motivational interviewing, active listening, reassurance and support in the context of cognitive behavioral therapy to address the above.      MENTAL STATUS EVALUATION  Grooming: Well-groomed  Attire: Appropriate  Age: Appears Stated  Behavior Towards Examiner: Cooperative  Motor Activity: Within normal limits  Eye Contact: Appropriate  Mood: Sad   Affect: full range  Speech/Language: normal  Attention: Easily distracted  Concentration: Sufficient  Thought Process: tangential  Thought Content: Clear    Orientation: Appeared oriented to person, place, and time, though not formally established  Memory: No evidence of impairment.  Judgement: Adequate  Estimated Intelligence: Above Average  Demonstrated Insight: Adequate  Fund of Knowledge: Adequate    Intervention:   Patient was readministered the PHQ-9, JAH-7, and PCL-5 as  part of revising her treatment plan.  Her score of 9 on the PHQ-9 is suggestive of mild depression and is an improvement over her earlier score of 15.  Her score of 6 on the JAH-7 remains suggestive of mild anxiety and is similar to her earlier score.  Patient score of 29 on the PCL-5 is much improved over her previous score of 42, but patient opined that much of this improvement is attributable to her only having left her apartment a couple of times in January, and as a result, not being exposed to triggers.  Patient reported continued hypervigilance, and exaggerated startle, trouble concentrating, and sleep disturbance.  We agreed to maintain her PTSD diagnosis despite the apparent improvement on the PCL-5.  Patient's depression diagnosis was revised to MDD, recurrent, mild.  We agreed to meet once every 3 weeks going forward.  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     Progress:  Patient treatment plan was jointly revised.    Plan:   We will meet again in 3 weeks to address the patient's depression, anxiety, and PTSD.  Estimated duration of treatment is 10+ individual therapy sessions (30266) at intervals of once every 3 weeks. Treatment is expected to be completed by October 2022.     Diagnosis:  Major Depressive Disorder, recurrent, mild  Generalized Anxiety Disorder  Posttraumatic Stress Disorder (PTSD)

## 2022-02-21 ENCOUNTER — VIRTUAL VISIT (OUTPATIENT)
Dept: NEUROLOGY | Facility: CLINIC | Age: 37
End: 2022-02-21
Payer: COMMERCIAL

## 2022-02-21 DIAGNOSIS — F33.0 MAJOR DEPRESSIVE DISORDER, RECURRENT EPISODE, MILD (H): Primary | ICD-10-CM

## 2022-02-21 PROCEDURE — 90834 PSYTX W PT 45 MINUTES: CPT | Mod: 95 | Performed by: PSYCHOLOGIST

## 2022-02-21 NOTE — LETTER
2/21/2022         RE: Yocasta Kohli  [address] [place] [address]        Dear Colleague,    Thank you for referring your patient, Yocasta Kohli, to the [facility]. Please see a copy of my visit note below.    Psychology Progress Note    Date: February 21, 2022    Time length and type of treatment: 40 minutes (1:00 PM to 1:40 PM), individual therapy    After review of the patient's situation, this visit was changed from an in-person visit to a  video visit via [employer] to reduce the risk of COVID 19 exposure. Patient was informed that policies and procedures that govern in-person sessions would also apply to  video sessions. Patient was also informed that  video sessions would be discontinued when COVID 19 exposure is no longer a concern (as determined by [facility]).     Patient location: Patient home in [place], [place]  Provider location:  [facility] - Provider remote location    Patient was in agreement with proceeding with a  video session.      Necessity: This session is necessary to address the patient's anxiety, depression, and PTSD.  Today we focus on the patient's treatment plan, specifically exploring coping strategies.  The reader is invited to review the patient's full treatment plan in the Media section of the patient's Epic medical record.    Psychotherapeutic Technique: This writer utilized motivational interviewing, active listening, reassurance and support in the context of cognitive behavioral therapy to address the above.      MENTAL STATUS EVALUATION  Grooming: Within normal limits  Attire: Appropriate  Age: Appears Stated  Behavior Towards Examiner: Cooperative  Motor Activity: Within normal limits  Eye Contact: Appropriate  Mood: Sad   Affect: full range  Speech/Language: normal  Attention: Normal  Concentration: Sufficient  Thought Process: unremarkable  Thought Content: Clear   "  Orientation: Appeared oriented to person, place, and time, though not formally establshed  Memory: No evidence of impairment.  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  However she learned that not only did her boyfriend not invite her to his 39th birthday party, an old girlfriend attended.  Patient discussed her reaction to this event, including letting her boyfriend know how angry she was and spending two days contemplating suicide.  Patient described 3 possible plans she considered. We discussed how patient was able to keep herself safe, and what she could put in place to keep herself safe in the future.  Patient noted she could call her best friend, but did not commit to doing so and a safety plan could not be developed.  Despite this, patient is not currently contemplating suicide and is not at imminent risk. Patient reported going out to dinner with her boyfriend and even though she \"doesn't trust him,\" she reported they are okay.      Progress:  Patient has successfully found a job which will help alleviate financial stressors.       Plan:   We will meet again in 3 weeks to address the patient's depression, anxiety, and PTSD.  Estimated duration of treatment is 10+ individual therapy sessions (23358) at intervals of once every three weeks. Treatment is expected to be completed by October 2022.     Diagnosis:  Major Depressive Disorder, recurrent, mild  Generalized Anxiety Disorder  Posttraumatic Stress Disorder (PTSD)      Again, thank you for allowing me to participate in the care of your patient.        Sincerely,        Jody Medina.CATALINO, DENICE    "

## 2022-02-21 NOTE — PROGRESS NOTES
Psychology Progress Note    Date: February 21, 2022    Time length and type of treatment: 40 minutes (1:00 PM to 1:40 PM), individual therapy    After review of the patient's situation, this visit was changed from an in-person visit to a  video visit via [employer] to reduce the risk of COVID 19 exposure. Patient was informed that policies and procedures that govern in-person sessions would also apply to  video sessions. Patient was also informed that  video sessions would be discontinued when COVID 19 exposure is no longer a concern (as determined by [facility]).     Patient location: Patient home in [place], [place]  Provider location:  [facility] - Provider remote location    Patient was in agreement with proceeding with a  video session.      Necessity: This session is necessary to address the patient's anxiety, depression, and PTSD.  Today we focus on the patient's treatment plan, specifically exploring coping strategies.  The reader is invited to review the patient's full treatment plan in the Media section of the patient's Epic medical record.    Psychotherapeutic Technique: This writer utilized motivational interviewing, active listening, reassurance and support in the context of cognitive behavioral therapy to address the above.      MENTAL STATUS EVALUATION  Grooming: Within normal limits  Attire: Appropriate  Age: Appears Stated  Behavior Towards Examiner: Cooperative  Motor Activity: Within normal limits  Eye Contact: Appropriate  Mood: Sad   Affect: full range  Speech/Language: normal  Attention: Normal  Concentration: Sufficient  Thought Process: unremarkable  Thought Content: Clear    Orientation: Appeared oriented to person, place, and time, though not formally establshed  Memory: No evidence of impairment.  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  However she learned that not only did her boyfriend not invite her to his 39th birthday party, an old girlfriend attended.  Patient discussed her reaction to this event, including letting her boyfriend know how angry she was and spending two days contemplating suicide.  Patient described 3 possible plans she considered. We discussed how patient was able to keep herself safe, and what she could put in place to keep herself safe in the future.  Patient noted she could call her best friend, but did not commit to doing so and a safety plan could not be developed.  Despite this, patient is not currently contemplating suicide and is not at imminent risk. Patient reported going out to dinner with her boyfriend and even though she \"doesn't trust him,\" she reported they are okay.      Progress:  Patient has successfully found a job which will help alleviate financial stressors.       Plan:   We will meet again in 3 weeks to address the patient's depression, anxiety, and PTSD.  Estimated duration of treatment is 10+ individual therapy sessions (84879) at intervals of once every three weeks. Treatment is expected to be completed by October 2022.     Diagnosis:  Major Depressive Disorder, recurrent, mild  Generalized Anxiety Disorder  Posttraumatic Stress Disorder (PTSD)  "

## 2022-03-14 ENCOUNTER — VIRTUAL VISIT (OUTPATIENT)
Dept: NEUROLOGY | Facility: CLINIC | Age: 37
End: 2022-03-14
Payer: COMMERCIAL

## 2022-03-14 DIAGNOSIS — F33.0 MAJOR DEPRESSIVE DISORDER, RECURRENT EPISODE, MILD (H): Primary | ICD-10-CM

## 2022-03-14 PROCEDURE — 90834 PSYTX W PT 45 MINUTES: CPT | Mod: 95 | Performed by: PSYCHOLOGIST

## 2022-03-14 NOTE — LETTER
3/14/2022         RE: Yocasta Kohli  [address] [place] [address]        Dear Colleague,    Thank you for referring your patient, Yocasta Kohli, to the [facility]. Please see a copy of my visit note below.    Psychology Progress Note    Date: March 14, 2022    Time length and type of treatment: 46 minutes (1:02 PM to 1:48 PM), individual therapy    After review of the patient's situation, this visit was changed from an in-person visit to a video visit via [employer] to reduce the risk of COVID 19 exposure. Patient was informed that policies and procedures that govern in-person sessions would also apply to video sessions. Patient was also informed that video sessions would be discontinued when COVID 19 exposure is no longer a concern (as determined by [facility]).     Patient location: Patient home in [place], [place]  Provider location:  [facility] - Provider remote location    Patient was in agreement with proceeding with a video session.      Necessity: This session is necessary to address the patient's anxiety, depression, and PTSD.  Today we focus on the patient's treatment plan, specifically exploring thoughts and expectations of self and others.  The reader is invited to review the patient's full treatment plan in the Media section of the patient's Epic medical record.    Psychotherapeutic Technique: This writer utilized motivational interviewing, active listening, reassurance and support in the context of cognitive behavioral therapy to address the above.      MENTAL STATUS EVALUATION  Grooming: Within normal limits  Attire: Appropriate  Age: Appears Stated  Behavior Towards Examiner: Cooperative  Motor Activity: Within normal limits  Eye Contact: Appropriate  Mood: Sad   Affect: full range  Speech/Language: normal  Attention: Normal  Concentration: Sufficient  Thought Process: unremarkable  Thought  Content: Clear    Orientation: Appeared oriented to person, place, and time, though not formally established  Memory: No evidence of impairment.  Judgement: Adequate  Estimated Intelligence: Above Average  Demonstrated Insight: Adequate  Fund of Knowledge: Adequate    Intervention:   Patient discussed several relationship conflicts and explored how she wishes to respond given behavior she experienced as manipulative and intrusive. Multiple positive events were discussed, including that patient has a set work schedule and is prepared to start her new job, and patient is again working on obtaining a 's license. Patient competence and positive progress was validated and reinforced.     Progress:  Patient demonstrated increased insight into interpersonal dynamics.     Plan:   We will meet again in 3 weeks to address the patient's depression, anxiety, and PTSD.  Estimated duration of treatment is 10+ individual therapy sessions (45916) at intervals of once every three weeks. Treatment is expected to be completed by October 2022.     Diagnosis:  Major Depressive Disorder, recurrent, mild  Generalized Anxiety Disorder  Posttraumatic Stress Disorder (PTSD)      Again, thank you for allowing me to participate in the care of your patient.        Sincerely,        Rut Camara Psy.D, LP

## 2022-03-14 NOTE — PROGRESS NOTES
Psychology Progress Note    Date: March 14, 2022    Time length and type of treatment: 46 minutes (1:02 PM to 1:48 PM), individual therapy    After review of the patient's situation, this visit was changed from an in-person visit to a video visit via [employer] to reduce the risk of COVID 19 exposure. Patient was informed that policies and procedures that govern in-person sessions would also apply to video sessions. Patient was also informed that video sessions would be discontinued when COVID 19 exposure is no longer a concern (as determined by [facility]).     Patient location: Patient home in [place], [place]  Provider location:  [facility] - Provider remote location    Patient was in agreement with proceeding with a video session.      Necessity: This session is necessary to address the patient's anxiety, depression, and PTSD.  Today we focus on the patient's treatment plan, specifically exploring thoughts and expectations of self and others.  The reader is invited to review the patient's full treatment plan in the Media section of the patient's Epic medical record.    Psychotherapeutic Technique: This writer utilized motivational interviewing, active listening, reassurance and support in the context of cognitive behavioral therapy to address the above.      MENTAL STATUS EVALUATION  Grooming: Within normal limits  Attire: Appropriate  Age: Appears Stated  Behavior Towards Examiner: Cooperative  Motor Activity: Within normal limits  Eye Contact: Appropriate  Mood: Sad   Affect: full range  Speech/Language: normal  Attention: Normal  Concentration: Sufficient  Thought Process: unremarkable  Thought Content: Clear    Orientation: Appeared oriented to person, place, and time, though not formally established  Memory: No evidence of impairment.  Judgement: Adequate  Estimated Intelligence: Above Average  Demonstrated Insight: Adequate  Fund of Knowledge: Adequate    Intervention:    Patient discussed several relationship conflicts and explored how she wishes to respond given behavior she experienced as manipulative and intrusive. Multiple positive events were discussed, including that patient has a set work schedule and is prepared to start her new job, and patient is again working on obtaining a 's license. Patient competence and positive progress was validated and reinforced.     Progress:  Patient demonstrated increased insight into interpersonal dynamics.     Plan:   We will meet again in 3 weeks to address the patient's depression, anxiety, and PTSD.  Estimated duration of treatment is 10+ individual therapy sessions (80492) at intervals of once every three weeks. Treatment is expected to be completed by October 2022.     Diagnosis:  Major Depressive Disorder, recurrent, mild  Generalized Anxiety Disorder  Posttraumatic Stress Disorder (PTSD)

## 2022-03-23 ENCOUNTER — HOSPITAL ENCOUNTER (OUTPATIENT)
Dept: OCCUPATIONAL THERAPY | Facility: CLINIC | Age: 37
Setting detail: THERAPIES SERIES
Discharge: HOME | End: 2022-03-23
Attending: NURSE PRACTITIONER
Payer: COMMERCIAL

## 2022-03-23 PROCEDURE — 97535 SELF CARE MNGMENT TRAINING: CPT | Mod: GO | Performed by: OCCUPATIONAL THERAPIST

## 2022-03-23 PROCEDURE — 97129 THER IVNTJ 1ST 15 MIN: CPT | Mod: GO | Performed by: OCCUPATIONAL THERAPIST

## 2022-04-04 ENCOUNTER — VIRTUAL VISIT (OUTPATIENT)
Dept: NEUROLOGY | Facility: CLINIC | Age: 37
End: 2022-04-04
Payer: COMMERCIAL

## 2022-04-04 DIAGNOSIS — F41.1 GAD (GENERALIZED ANXIETY DISORDER): Primary | ICD-10-CM

## 2022-04-04 PROCEDURE — 90834 PSYTX W PT 45 MINUTES: CPT | Mod: 95 | Performed by: PSYCHOLOGIST

## 2022-04-04 NOTE — LETTER
4/4/2022         RE: Yocasta Kohli  [address] [place] [address]        Dear Colleague,    Thank you for referring your patient, Yocasta Kohli, to the [facility]. Please see a copy of my visit note below.    Psychology Progress Note    Date: April 04, 2022    Time length and type of treatment: 46 minutes (1:01 PM - 1:47 PM), individual therapy    After review of the patient's situation, this visit was changed from an in-person visit to a video visit via [employer] to reduce the risk of COVID 19 exposure. Patient was informed that policies and procedures that govern in-person sessions would also apply to video sessions. Patient was also informed that video sessions would be discontinued when COVID 19 exposure is no longer a concern (as determined by [facility]).     Patient location: Patient home in [place], [place]  Provider location:  [facility] - Provider remote location    Patient was in agreement with proceeding with a video session.      Necessity: This session is necessary to address the patient's anxiety, depression, and PTSD.  Today we focus on the patient's treatment plan, specifically exploring thoughts and expectations of self and others, and bibliotherapy. The reader is invited to review the patient's full treatment plan in the Media section of the patient's Epic medical record.    Psychotherapeutic Technique: This writer utilized motivational interviewing, active listening, reassurance and support in the context of cognitive behavioral therapy to address the above.      MENTAL STATUS EVALUATION  Grooming: Within normal limits  Attire: Appropriate  Age: Appears Stated  Behavior Towards Examiner: Cooperative  Motor Activity: Within normal limits  Eye Contact: Appropriate  Mood: Euthymic  Affect: full range  Speech/Language: normal  Attention: Normal  Concentration: Sufficient  Thought Process:  unremarkable  Thought Content: Clear    Orientation: Appeared oriented to person, place, and time, though not formally established  Memory: No evidence of impairment.  Judgement: Adequate  Estimated Intelligence: Above Average  Demonstrated Insight: Adequate  Fund of Knowledge: Adequate    Intervention:   Patient reported that her depression has lessened, which she attributed to her feeling better now that she is working.  She reported a small increase in anxiety due to stressors of work (getting there on time, making our appointment without missing work, etc.) but noted that her current position is more flexible than past jobs, and this is helpful.  Patient and her partner are also getting along better now that patient is busier.  Patient has one client who watches graphic content when the patient is at his home, but patient does not feel comfortable addressing this with him.  We discussed boundaries and barriers to setting boundaries, and a book on limit setting was recommended.     Progress:  Patient reports a decline in depression secondary to being employed.    Plan:   We will meet again in 3 weeks to address the patient's anxiety, depression, and PTSD.  Estimated duration of treatment is 10+ individual therapy sessions (91031) at intervals of once every three weeks. Treatment is expected to be completed by October 2022.     Diagnosis:  Generalized Anxiety Disorder  Major Depressive Disorder, recurrent, mild  Posttraumatic Stress Disorder (PTSD)      Again, thank you for allowing me to participate in the care of your patient.        Sincerely,        Rut Camara Psy.D, LP

## 2022-04-04 NOTE — PROGRESS NOTES
Psychology Progress Note    Date: April 04, 2022    Time length and type of treatment: 46 minutes (1:01 PM - 1:47 PM), individual therapy    After review of the patient's situation, this visit was changed from an in-person visit to a video visit via [employer] to reduce the risk of COVID 19 exposure. Patient was informed that policies and procedures that govern in-person sessions would also apply to video sessions. Patient was also informed that video sessions would be discontinued when COVID 19 exposure is no longer a concern (as determined by [facility]).     Patient location: Patient home in [place], [place]  Provider location:  [facility] - Provider remote location    Patient was in agreement with proceeding with a video session.      Necessity: This session is necessary to address the patient's anxiety, depression, and PTSD.  Today we focus on the patient's treatment plan, specifically exploring thoughts and expectations of self and others, and bibliotherapy. The reader is invited to review the patient's full treatment plan in the Media section of the patient's Epic medical record.    Psychotherapeutic Technique: This writer utilized motivational interviewing, active listening, reassurance and support in the context of cognitive behavioral therapy to address the above.      MENTAL STATUS EVALUATION  Grooming: Within normal limits  Attire: Appropriate  Age: Appears Stated  Behavior Towards Examiner: Cooperative  Motor Activity: Within normal limits  Eye Contact: Appropriate  Mood: Euthymic  Affect: full range  Speech/Language: normal  Attention: Normal  Concentration: Sufficient  Thought Process: unremarkable  Thought Content: Clear    Orientation: Appeared oriented to person, place, and time, though not formally established  Memory: No evidence of impairment.  Judgement: Adequate  Estimated Intelligence: Above Average  Demonstrated Insight: Adequate  Fund of Knowledge:  Adequate    Intervention:   Patient reported that her depression has lessened, which she attributed to her feeling better now that she is working.  She reported a small increase in anxiety due to stressors of work (getting there on time, making our appointment without missing work, etc.) but noted that her current position is more flexible than past jobs, and this is helpful.  Patient and her partner are also getting along better now that patient is busier.  Patient has one client who watches graphic content when the patient is at his home, but patient does not feel comfortable addressing this with him.  We discussed boundaries and barriers to setting boundaries, and a book on limit setting was recommended.     Progress:  Patient reports a decline in depression secondary to being employed.    Plan:   We will meet again in 3 weeks to address the patient's anxiety, depression, and PTSD.  Estimated duration of treatment is 10+ individual therapy sessions (94552) at intervals of once every three weeks. Treatment is expected to be completed by October 2022.     Diagnosis:  Generalized Anxiety Disorder  Major Depressive Disorder, recurrent, mild  Posttraumatic Stress Disorder (PTSD)

## 2022-04-13 DIAGNOSIS — Z30.41 ORAL CONTRACEPTIVE USE: ICD-10-CM

## 2022-04-13 RX ORDER — NORETHINDRONE ACETATE AND ETHINYL ESTRADIOL 1MG-20(21)
1 KIT ORAL DAILY
Qty: 84 TABLET | Refills: 0 | Status: SHIPPED | OUTPATIENT
Start: 2022-04-13 | End: 2022-07-10

## 2022-04-13 NOTE — TELEPHONE ENCOUNTER
Incoming Rx request for Junel birth control from [facility]. Patient has not been seen by Dr. Loyd since her Px on 07/28/22. Does PCP want patient to be seen or willing refill Rx?

## 2022-04-27 ENCOUNTER — TELEPHONE (OUTPATIENT)
Dept: OCCUPATIONAL THERAPY | Facility: CLINIC | Age: 37
End: 2022-04-27
Payer: COMMERCIAL

## 2022-05-09 ENCOUNTER — VIRTUAL VISIT (OUTPATIENT)
Dept: NEUROLOGY | Facility: CLINIC | Age: 37
End: 2022-05-09
Payer: COMMERCIAL

## 2022-05-09 DIAGNOSIS — F33.1 MAJOR DEPRESSIVE DISORDER, RECURRENT EPISODE, MODERATE (H): Primary | ICD-10-CM

## 2022-05-09 PROCEDURE — 90834 PSYTX W PT 45 MINUTES: CPT | Mod: 95 | Performed by: PSYCHOLOGIST

## 2022-05-09 NOTE — PROGRESS NOTES
Psychology Progress Note    Date: May 9, 2022    Time length and type of treatment: 42 minutes (1:04 PM - 1:46 PM) , individual therapy    After review of the patient's situation, this visit was changed from an in-person visit to a  video visit via [employer] to reduce the risk of COVID 19 exposure. Patient was informed that policies and procedures that govern in-person sessions would also apply to  video sessions. Patient was also informed that  video sessions would be discontinued when COVID 19 exposure is no longer a concern (as determined by [facility]).     Patient location: Patient home in [place], [place]  Provider location:  [facility] - Provider remote location    Patient was in agreement with proceeding with a  video session.      Necessity: This session is necessary to address the patient's anxiety, depression, and PTSD.  Today we focus on revising the patient's treatment plan. The reader is invited to review the patient's full treatment plan in the Media section of the patient's Epic medical record.    Psychotherapeutic Technique: This writer utilized motivational interviewing, active listening, reassurance and support in the context of cognitive behavioral therapy to address the above.      MENTAL STATUS EVALUATION  Grooming: Within normal limits  Attire: Appropriate  Age: Appears Stated  Behavior Towards Examiner: Cooperative  Motor Activity: Within normal limits  Eye Contact: Appropriate  Mood: Sad   Affect: full range  Speech/Language: normal  Attention: Normal  Concentration: Sufficient  Thought Process: Within normal limits  Thought Content: Clear    Orientation: Appeared oriented to person, place, and time, though not formally established  Memory: No evidence of impairment.  Judgement: Adequate  Estimated Intelligence: Above Average  Demonstrated Insight: Adequate  Fund of Knowledge: Adequate    Intervention:   Patient was readministered the PHQ-9 JAH-7 and PCL-5 as  part of revising her treatment plan.  Her score of 6 on the JAH-7 is identical to her earlier score and is suggestive of mild anxiety.  Her score of 12 on the PHQ-9 is suggestive of moderate depression and a modest increase over her earlier score of 9.  Patient attributed the increase in depression to sadness over her father leaving to return to [place], feeling like she did not get to see enough of him while he was visiting, and financial stress.  Patient score of 25 on the PCL-5 is below the cut score of 32 or 33 recommended for consideration of PTSD, and is a significant decline from her earlier score of 42. 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 At this time, a PTSD diagnosis will be removed, but trauma will continue to be a secondary focus of our work. Patient treatment plan was revised. Remaining time was spent providing patient psychoeducation related to trauma and anxiety.     Progress:  Patient treatment plan was jointly revised    Plan:   We will meet again in 3 weeks to address the patient's depression and anxiety.  Estimated duration of treatment is 10+ individual therapy sessions (08882) at intervals of once every 3 weeks. Treatment is expected to be completed by October 2022.     Diagnosis:  Major Depressive Disorder, recurrent, moderate  Generalized Anxiety Disorder

## 2022-05-25 NOTE — PROGRESS NOTES
"[facility]    Outpatient Occupational Therapy Discharge Note  Patient: Yocasta Kohli  : 1985    Beginning/End Dates of Reporting Period:  22 to 22    Referring Provider: Krista WILLIAMSON CNP    Therapy Diagnosis: Impaired ADL/IADL due to Post Concussion Syndrome and Memory Loss from 21    Client Self Report: last visit on 3/23/22: Late arrival for session x 24 min. HA's may occur a few days in a row 30 min max and then I may have a number of days without a HA at all. Needs a root canal for infection in May took antibiotics-BRIGGS's were on L side where tooth was aching. 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 Will have two 4 hour shifts in a day but will have a break between    Objective Measurements:     Objective Measure: Concussion Symptom Assessment (CSA)-see separate flowsheet   Details: total symptoms: 7; total score: 8/90; higher scores = higher degree of symptoms        Goals:     Goal Identifier     Goal Description Pt will demonstrate understanding of at least 2 methods to reduce visual strain and fatigue with visual tasks such as reading and near work   Target Date 22   Date Met      Progress (detail required for progress note):  Met; convergence improved to 4.5\", saccades improved but still some errors, resting eyes with christina for tv and phone use each for an hour; visual processing speed improved with Dynavision to WFL     Goal Identifier     Goal Description Pt will demonstrate understanding of at least 3 methods to compensate for reduced attention and memory to demonstrate follow through with daily tasks she needs to complete at 90% accuracy or >.   Target Date 22   Date Met      Progress (detail required for progress note):  Not met; min cues to attend to aspects of play with card game with dual tasking; Min cues complex problem solving " when last assessed on 12//9/21     Goal Identifier     Goal Description Pt will demonstrate improved management of concussion symptoms and sensory tolerance to demonstrate CSA total score of no greater than 10   Target Date 04/19/22   Date Met      Progress (detail required for progress note):  CSA scores improved to 7 total symptoms and total score of 8/90 (initial OT eval 11/2/21: 10 symptoms and 16/90). Pt did not complete Safe and Sound Protocol. She did initiate this with therapist 15 min on 12/23/21 and then used approx 15 min in session 3/23/22 and did not complete home program so less than 1 hour of 5 hour protocol complete and so discontinued as pt did not return to therapy.       Plan:  Discharge from therapy.    Discharge:    Reason for Discharge: Patient chooses to discontinue therapy. Seen for 5 total sessions in OT-see above for progress and progress note from 1/25/22.    Equipment Issued: none    Discharge Plan: discharge at this time as pt cancelled visit and then did not show for final visit scheduled.

## 2022-06-13 ENCOUNTER — VIRTUAL VISIT (OUTPATIENT)
Dept: NEUROLOGY | Facility: CLINIC | Age: 37
End: 2022-06-13
Payer: COMMERCIAL

## 2022-06-13 DIAGNOSIS — F33.1 MAJOR DEPRESSIVE DISORDER, RECURRENT EPISODE, MODERATE (H): Primary | ICD-10-CM

## 2022-06-13 PROCEDURE — 90834 PSYTX W PT 45 MINUTES: CPT | Mod: 95 | Performed by: PSYCHOLOGIST
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
## 2022-06-27 ENCOUNTER — VIRTUAL VISIT (OUTPATIENT)
Dept: NEUROLOGY | Facility: CLINIC | Age: 37
End: 2022-06-27
Payer: COMMERCIAL

## 2022-06-27 DIAGNOSIS — F33.1 MAJOR DEPRESSIVE DISORDER, RECURRENT EPISODE, MODERATE (H): Primary | ICD-10-CM

## 2022-06-27 PROCEDURE — 90834 PSYTX W PT 45 MINUTES: CPT | Mod: 95 | Performed by: PSYCHOLOGIST

## 2022-06-27 NOTE — PROGRESS NOTES
Psychology Progress Note    Date: June 27, 2022    Time length and type of treatment: 44 minutes (3:02 PM to 3:46 PM), individual therapy    After review of the patient's situation, this visit was changed from an in-person visit to a  video visit via [employer] to reduce the risk of COVID 19 exposure. Patient was informed that policies and procedures that govern in-person sessions would also apply to  video sessions. Patient was also informed that  video sessions would be discontinued when COVID 19 exposure is no longer a concern (as determined by [facility]).     Patient location: Patient home in [place], [place]  Provider location:  [facility] - Provider remote location    Patient was in agreement with proceeding with a video session.      Necessity: This session is necessary to address the patient's depression and anxiety.  Today we focus on the patient's treatment plan, specifically exploring thoughts and expectations of self and others. The reader is invited to review the patient's full treatment plan in the Media section of the patient's Epic medical record.    Psychotherapeutic Technique: This writer utilized motivational interviewing, active listening, reassurance and support in the context of cognitive behavioral therapy to address the above.      MENTAL STATUS EVALUATION  Grooming: Within normal limits  Attire: Appropriate  Age: Appears Stated  Behavior Towards Examiner: Cooperative  Motor Activity: Within normal limits  Eye Contact: Appropriate  Mood: Sad   Affect: full range  Speech/Language: normal  Attention: Normal  Concentration: Sufficient  Thought Process: tangential  Thought Content: Clear    Orientation: Appeared oriented to person, place, and time, though not formally established  Memory: No evidence of impairment.  Judgement: Adequate  Estimated Intelligence: Above Average  Demonstrated Insight: Adequate  Fund of Knowledge: Adequate    Intervention:   Patient  discussed a painful interaction with her mother in which her mother told her to go away.  Patient acknowledged a tendency to push away her feelings or to drink alcohol as her primary coping strategies.  She also discussed her feelings of failure and shame related to her ongoing financial struggles. Feelings having useful features was met with minimal receptivity.  Patient resilience in managing under difficult circumstances was validated and reinforced.     Patient has not yet obtained a copy of her diagnostic assessment, which her parents are requiring she provide as a condition of giving her any financial assistance.  Patient acknowledged ambivalence over this request, wanting both to retain her privacy and receive some help.  Patient right to privacy was validated and reinforced, and it was again discussed that patient could black out parts of the assessment she didn't wish to share, if this felt more comfortable for her.       Progress:  Patient continues to feel overwhelmed by multiple stressors and is coping with rejection from a recent interaction with her mom.    Plan:   We will meet again in 3 weeks to address the patient's depression and anxiety.  Estimated duration of treatment is 10+ individual therapy sessions (68990) at intervals of once every three weeks. Treatment is expected to be completed by October 2022.     Diagnosis:  Major Depressive Disorder, recurrent, moderate  Generalized Anxiety Disorder

## 2022-07-07 DIAGNOSIS — Z30.41 ORAL CONTRACEPTIVE USE: ICD-10-CM

## 2022-07-07 NOTE — TELEPHONE ENCOUNTER
Routing refill request to provider for review/approval because:  Pt is a current smoker    Roula Jett RN

## 2022-07-10 RX ORDER — NORETHINDRONE ACETATE AND ETHINYL ESTRADIOL AND FERROUS FUMARATE 1MG-20(21)
KIT ORAL
Qty: 84 TABLET | Refills: 0 | Status: SHIPPED | OUTPATIENT
Start: 2022-07-10 | End: 2023-08-11

## 2022-07-25 ENCOUNTER — LAB (OUTPATIENT)
Dept: LAB | Facility: CLINIC | Age: 37
End: 2022-07-25
Payer: COMMERCIAL

## 2022-07-25 DIAGNOSIS — Z20.822 CLOSE EXPOSURE TO 2019 NOVEL CORONAVIRUS: ICD-10-CM

## 2022-07-25 PROCEDURE — U0003 INFECTIOUS AGENT DETECTION BY NUCLEIC ACID (DNA OR RNA); SEVERE ACUTE RESPIRATORY SYNDROME CORONAVIRUS 2 (SARS-COV-2) (CORONAVIRUS DISEASE [COVID-19]), AMPLIFIED PROBE TECHNIQUE, MAKING USE OF HIGH THROUGHPUT TECHNOLOGIES AS DESCRIBED BY CMS-2020-01-R: HCPCS

## 2022-07-25 PROCEDURE — U0005 INFEC AGEN DETEC AMPLI PROBE: HCPCS

## 2022-07-26 LAB — SARS-COV-2 RNA RESP QL NAA+PROBE: NEGATIVE

## 2022-08-15 ENCOUNTER — VIRTUAL VISIT (OUTPATIENT)
Dept: NEUROLOGY | Facility: CLINIC | Age: 37
End: 2022-08-15

## 2022-08-15 DIAGNOSIS — F33.1 MAJOR DEPRESSIVE DISORDER, RECURRENT EPISODE, MODERATE (H): ICD-10-CM

## 2022-08-15 DIAGNOSIS — F07.81 POST CONCUSSION SYNDROME: Primary | ICD-10-CM

## 2022-08-15 DIAGNOSIS — R41.3 MEMORY LOSS: ICD-10-CM

## 2022-08-15 PROCEDURE — 99213 OFFICE O/P EST LOW 20 MIN: CPT | Mod: 95 | Performed by: NURSE PRACTITIONER

## 2022-08-15 NOTE — PROGRESS NOTES
How would you like to obtain your AVS? e[employer]  the invitation should be sent by: [employer] - [phone]
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
## 2022-08-15 NOTE — NURSING NOTE
Chief Complaint   Patient presents with     Concussion     Follow up visit  Reference for 2 day mental eval
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
## 2022-08-18 ENCOUNTER — VIRTUAL VISIT (OUTPATIENT)
Dept: NEUROLOGY | Facility: CLINIC | Age: 37
End: 2022-08-18
Payer: COMMERCIAL

## 2022-08-18 DIAGNOSIS — F33.0 MAJOR DEPRESSIVE DISORDER, RECURRENT EPISODE, MILD (H): Primary | ICD-10-CM

## 2022-08-18 PROCEDURE — 90834 PSYTX W PT 45 MINUTES: CPT | Mod: 95 | Performed by: PSYCHOLOGIST
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
## 2022-09-19 ENCOUNTER — VIRTUAL VISIT (OUTPATIENT)
Dept: NEUROLOGY | Facility: CLINIC | Age: 37
End: 2022-09-19
Payer: COMMERCIAL

## 2022-09-19 DIAGNOSIS — F41.1 GAD (GENERALIZED ANXIETY DISORDER): Primary | ICD-10-CM

## 2022-09-19 PROCEDURE — 90834 PSYTX W PT 45 MINUTES: CPT | Mod: 95 | Performed by: PSYCHOLOGIST
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
## 2022-10-13 ENCOUNTER — VIRTUAL VISIT (OUTPATIENT)
Dept: NEUROLOGY | Facility: CLINIC | Age: 37
End: 2022-10-13
Payer: COMMERCIAL

## 2022-10-13 DIAGNOSIS — F33.0 MAJOR DEPRESSIVE DISORDER, RECURRENT EPISODE, MILD (H): Primary | ICD-10-CM

## 2022-10-13 PROCEDURE — 90834 PSYTX W PT 45 MINUTES: CPT | Mod: 95 | Performed by: PSYCHOLOGIST
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
## 2022-10-14 ENCOUNTER — TELEPHONE (OUTPATIENT)
Dept: NEUROLOGY | Facility: CLINIC | Age: 37
End: 2022-10-14

## 2022-10-14 NOTE — TELEPHONE ENCOUNTER
----- Message from Rut Camara, PhD LP sent at 10/13/2022  1:52 PM CDT -----  Regarding: Schedule  Please reach out to Yocasta to schedule future appointments.  Thanks.

## 2022-11-11 ENCOUNTER — OFFICE VISIT (OUTPATIENT)
Dept: NEUROLOGY | Facility: CLINIC | Age: 37
End: 2022-11-11
Payer: COMMERCIAL

## 2022-11-11 DIAGNOSIS — F33.0 MAJOR DEPRESSIVE DISORDER, RECURRENT EPISODE, MILD (H): ICD-10-CM

## 2022-11-11 DIAGNOSIS — F41.1 GAD (GENERALIZED ANXIETY DISORDER): ICD-10-CM

## 2022-11-11 DIAGNOSIS — F33.1 MAJOR DEPRESSIVE DISORDER, RECURRENT EPISODE, MODERATE (H): ICD-10-CM

## 2022-11-11 DIAGNOSIS — R41.3 MEMORY LOSS: ICD-10-CM

## 2022-11-11 DIAGNOSIS — F07.81 POST CONCUSSION SYNDROME: ICD-10-CM

## 2022-11-11 DIAGNOSIS — G31.84 MILD NEUROCOGNITIVE DISORDER: Primary | ICD-10-CM

## 2022-11-11 PROCEDURE — 96133 NRPSYC TST EVAL PHYS/QHP EA: CPT | Performed by: CLINICAL NEUROPSYCHOLOGIST

## 2022-11-11 PROCEDURE — 96138 PSYCL/NRPSYC TECH 1ST: CPT | Mod: 59 | Performed by: CLINICAL NEUROPSYCHOLOGIST

## 2022-11-11 PROCEDURE — 96116 NUBHVL XM PHYS/QHP 1ST HR: CPT | Performed by: CLINICAL NEUROPSYCHOLOGIST

## 2022-11-11 PROCEDURE — 96136 PSYCL/NRPSYC TST PHY/QHP 1ST: CPT | Performed by: CLINICAL NEUROPSYCHOLOGIST

## 2022-11-11 PROCEDURE — 96137 PSYCL/NRPSYC TST PHY/QHP EA: CPT | Performed by: CLINICAL NEUROPSYCHOLOGIST

## 2022-11-11 PROCEDURE — 96132 NRPSYC TST EVAL PHYS/QHP 1ST: CPT | Performed by: CLINICAL NEUROPSYCHOLOGIST

## 2022-11-11 NOTE — PROGRESS NOTES
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 measured as a within normal limits score for her age (14). She was oriented to person, place, time, and date and was able to correctly name the current and previous presidents.    Performance on a measure of basic attention and working memory was assessed as an average score (25). This reflected an average score for basic attention skills (LDF = 7) and working memory scores that ranged from a low average score (LDB = 3) to an average score (LDS = 6).    A speeded color naming task (26\") and a speeded word reading task (21\") were both assessed as an average score. A simple sequencing task (21\" ) was assessed as a high average score.     Language  Sight word reading skills (60) were assessed as an average score. Verbal abstraction skills (27) were assessed as an average score. Fund of general knowledge (18) was assessed as a high average score. Her performance on a measure of semantic fluency was measured as a high average score (44).     Visuospatial Skills  Performance on a " "block construction task (53) resulted in a high average score. Performance on a pattern completion task (16) was measured as an average score.    Learning and Memory  On a more difficult list learning task, overall learning for a 16 item word list was measured as an average score (7,11,10,14,12). After a brief delay, she was able to retain 10 words (a low average score) for immediate recall performance. After a longer 20 minute delay, she was able to retain 13 words (an average score) for delayed free recall performance. Some benefit was obtained from recognition cues as recognition memory was assessed as an average score. She correctly identified 16 words, and made 4 false positive errors.    Executive Functioning  Working memory skills ranged from a low average score (LDB = 3) to an average score (LDS = 6). A complex sequencing and set shifting task was measured as an average score (59 ). This task was completed without error. Her performance on a measure of phonemic fluency resulted in a high average score (48). Semantic set shifting skills were measured as an average score (Category Switching Accuracy = 8). Her ability to inhibit an over-learned response was assessed as an average score (55\"). Her performance on this task was notable for 2 errors (average score). On the more challenging set shifting portion of the task in which she was required to alternate between providing and inhibiting an over-learned response, she earned an average score (59\") and this latter task was completed with just one error (average score).     Mood  On the Patient Health Questionnaire-9, a self report measure of depressive symptomatology, she obtained a score of 7, placing her in the range of mild depression. She denied suicidal ideation.    On the Generalized Anxiety Disorder-7, a self-report measure of anxiety, she obtained a score of 12, placing her in the range of moderate anxiety.     EVALUATION SERVICES AND TIME:   A clinical " interview/neurobehavioral status examination was conducted with the patient and documented. I thoroughly reviewed the medical record, selected the neuropsychological test battery, provided supervision to the individual who administered and scored the neuropsychological test battery, interpreted/integrated patient data and test results, engaged in clinical decision making, treatment planning, report writing/preparation and provided and documented interactive feedback of test results on the day of testing . I (Psychologist) directly administered/scored 2+ of the neuropsychological tests and a trained examiner/technician scored the remainder of the neuropsychological tests (2 + tests). Please see below for a breakdown of time spent and the associated codes billed for these services.    Services   Time Spent  CPT Codes   Neurobehavioral Status Exam:  (e.g., face-to-face, interpretation, report) 40 minutes 1 x 96116   Neuropsychological Evaluation Services:   (e.g., integration, interpretation, treatment planning, clinical decision making, feedback)   122 minutes   1 x 96132  1 x 96133   Neuropsychological Testing by Psychologist:  (e.g., test administration, scoring, 2+ tests administered)   110 minutes   1 x 96136  3 x 96137   Neuropsychological Testing by Trained Examiner/Technician:  (e.g., test administration, scoring, 2+ tests administered) Scoring time adjusted for training   25 minutes   1 x 96138     Diagnosis:  No Cognitive Diagnosis   Cognitive inefficiencies secondary to mood factors and poor sleep  Major Depressive Disorder, recurrent, mild (per chart)  Generalized Anxiety Disorder (per chart)    For diagnostic and coding purposes, Ms. Kohli has a history of July 2021 concussion, depression and anxiety and was referred for an evaluation of Mild Neurocognitive Disorder. Feedback of results was provided on the day of testing.       Karmen Mason, PhD, LP, ABPP  Clinical Neuropsychologist, [id number]  Board  Certified in Clinical Neuropsychology    [facility]  [address], [address]  [place] [address]  Phone:  [phone]

## 2022-11-11 NOTE — LETTER
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 least likely to be affected by acquired brain dysfunction (i.e.,  hold tests ).    SUMMARY OF TEST RESULTS:     Orientation, Attention and Processing Speed  Mental status exam was measured as a within normal limits score for her age (14). She was oriented to person, place, time, and date and was able to correctly name the current and previous presidents.    Performance on a measure of basic attention and working memory was assessed as an average score (25). This reflected an average score for basic attention skills (LDF = 7) and working memory scores that ranged from a low average score (LDB = 3) to an average score (LDS = 6).    A speeded color naming task (26\") and a speeded word reading task (21\") were both assessed as an average score. A simple sequencing task (21\" ) was assessed as a high average score.     Language  Sight word reading skills (60) were " "assessed as an average score. Verbal abstraction skills (27) were assessed as an average score. Fund of general knowledge (18) was assessed as a high average score. Her performance on a measure of semantic fluency was measured as a high average score (44).     Visuospatial Skills  Performance on a block construction task (53) resulted in a high average score. Performance on a pattern completion task (16) was measured as an average score.    Learning and Memory  On a more difficult list learning task, overall learning for a 16 item word list was measured as an average score (7,11,10,14,12). After a brief delay, she was able to retain 10 words (a low average score) for immediate recall performance. After a longer 20 minute delay, she was able to retain 13 words (an average score) for delayed free recall performance. Some benefit was obtained from recognition cues as recognition memory was assessed as an average score. She correctly identified 16 words, and made 4 false positive errors.    Executive Functioning  Working memory skills ranged from a low average score (LDB = 3) to an average score (LDS = 6). A complex sequencing and set shifting task was measured as an average score (59 ). This task was completed without error. Her performance on a measure of phonemic fluency resulted in a high average score (48). Semantic set shifting skills were measured as an average score (Category Switching Accuracy = 8). Her ability to inhibit an over-learned response was assessed as an average score (55\"). Her performance on this task was notable for 2 errors (average score). On the more challenging set shifting portion of the task in which she was required to alternate between providing and inhibiting an over-learned response, she earned an average score (59\") and this latter task was completed with just one error (average score).     Mood  On the Patient Health Questionnaire-9, a self report measure of depressive symptomatology, " she obtained a score of 7, placing her in the range of mild depression. She denied suicidal ideation.    On the Generalized Anxiety Disorder-7, a self-report measure of anxiety, she obtained a score of 12, placing her in the range of moderate anxiety.     EVALUATION SERVICES AND TIME:   A clinical interview/neurobehavioral status examination was conducted with the patient and documented. I thoroughly reviewed the medical record, selected the neuropsychological test battery, provided supervision to the individual who administered and scored the neuropsychological test battery, interpreted/integrated patient data and test results, engaged in clinical decision making, treatment planning, report writing/preparation and provided and documented interactive feedback of test results on the day of testing . I (Psychologist) directly administered/scored 2+ of the neuropsychological tests and a trained examiner/technician scored the remainder of the neuropsychological tests (2 + tests). Please see below for a breakdown of time spent and the associated codes billed for these services.    Services   Time Spent  CPT Codes   Neurobehavioral Status Exam:  (e.g., face-to-face, interpretation, report) 40 minutes 1 x 96116   Neuropsychological Evaluation Services:   (e.g., integration, interpretation, treatment planning, clinical decision making, feedback)   122 minutes   1 x 96132  1 x 96133   Neuropsychological Testing by Psychologist:  (e.g., test administration, scoring, 2+ tests administered)   110 minutes   1 x 96136  3 x 96137   Neuropsychological Testing by Trained Examiner/Technician:  (e.g., test administration, scoring, 2+ tests administered) Scoring time adjusted for training   25 minutes   1 x 96138     Diagnosis:  No Cognitive Diagnosis   Cognitive inefficiencies secondary to mood factors and poor sleep  Major Depressive Disorder, recurrent, mild (per chart)  Generalized Anxiety Disorder (per chart)    For diagnostic and  coding purposes, Ms. Kohli has a history of July 2021 concussion, depression and anxiety and was referred for an evaluation of Mild Neurocognitive Disorder. Feedback of results was provided on the day of testing.       Karmen Mason, PhD, LP, [facility]  Clinical Neuropsychologist, LP#[id number]  Board Certified in Clinical Neuropsychology    [facility] [address] , [address]  [place] [address]  Phone:  [phone]    The patient was seen for a neuropsychological evaluation for the purposes of diagnostic clarification and treatment planning.40 minutes were spent scoring and compiling test results. Please see Dr. Mason's report for a detailed description of the charges and interpretation and integration of the findings.      Again, thank you for allowing me to participate in the care of your patient.        Sincerely,        Karmen Mason, PhD LP

## 2022-11-14 NOTE — PROGRESS NOTES
The patient was seen for a neuropsychological evaluation for the purposes of diagnostic clarification and treatment planning.40 minutes were spent scoring and compiling test results. Please see Dr. Mason's report for a detailed description of the charges and interpretation and integration of the findings.

## 2022-11-15 NOTE — PATIENT INSTRUCTIONS
DIAGNOSTIC IMPRESSIONS (from 11/11/2022 Neuropsychology Consult):    Results  No cognitive impairments identified.  Performance was assessed fully within normal limits across measures of attention, speed of thinking, verbal learning, verbal memory, language, spatial skills, and problem-solving skills    Diagnosis  No Cognitive Diagnosis   Cognitive inefficiencies secondary to mood factors and poor sleep  Major Depressive Disorder, recurrent, mild (per chart)  Generalized Anxiety Disorder (per chart)    RECOMMENDATIONS:  Driving and Activities of Daily Living  Ms. Kohli should be reassured that her mental faculties are well preserved  There are no concerns with Ms. Kohli s current ability to continue to work, live independently, and manage her finances or personal affairs.  Physical and Emotional Health  Ms. Kohli is encouraged to continue with psychotherapy to help manage her mood symptoms.  She mentioned that she had been told that she should pursue a more trauma focused approach and I encouraged her to talk with Dr. Camara about whether such an approach would be appropriate, and if so, whether Dr. Camara could provide such an approach.   A trial of psychiatric medication might also be warranted if not medically contraindicated.  Ms. Kohli is encouraged to speak with Ms. De Luna about this or to establish with a new PCP to discuss options.  In the meantime, behavioral activation techniques such as regular exercise (under the guidance of her physician), recreational activities and regular social interaction (with proper social distancing when indicated) would likely be effective in helping Ms. Kohli to manage her mood.   It is important that Ms. Kohli continue to follow a healthy diet so as to maintain her physical health, and, under the guidance of her physician, it is recommended that regular exercise be integrated into Ms. Kohli's routine as it will likely benefit her cognitively and  emotionally as well as physically.  Given her history of sleep disturbance, Ms. Kohli may benefit from evaluating her current sleep hygiene behaviors and if need be, make changes to help facilitate sleep. Such changes might include avoiding watching television or reading in bed before attempting to fall asleep (instead, she should perform these activities outside of the bedroom), avoid eating, consuming caffeine or alcohol, or exercising several hours before trying to fall asleep, avoid napping during the day, and establishing a regular bedtime and wake-up time.  Relaxation exercises (e.g., listening to soothing music, deep breathing, progressive muscle relaxation) right before going to bed might also help. If she tends to have difficulty returning to sleep in the night or in falling asleep due to worrying, cognitive strategies, which could be discussed with her therapist may also be useful.   Memory and Organization  Ms. Kohli did not demonstrate memory deficits on testing; however, such difficulties are more likely to appear when she is especially tired/ fatigued, in pain or struggling with mood symptoms. In those situations, she will benefit from the following strategies.  In her daily life, Ms. Kohli may find it helpful to post reminder notes around the house, make lists, and carry a small calendar so that she can feel more comfortable and confident in her ability to remember information. A daily planner could also be used as a memory book where important information is recorded and organized for future reference.   In addition, Ms. Kohli may find it helpful to set up a file system that helps her to prioritize and organize tasks. For example, separate files could be created for different types of information (i.e., medical, financial, recreational) and set up in such a way so that high priority tasks or information is located at the front of the file.   Follow-up  No further follow-up with  Neuropsychology is recommended as Ms. Kohli's cognitive skills are solidly intact

## 2022-11-21 ENCOUNTER — HEALTH MAINTENANCE LETTER (OUTPATIENT)
Age: 37
End: 2022-11-21

## 2022-11-25 ENCOUNTER — VIRTUAL VISIT (OUTPATIENT)
Dept: NEUROLOGY | Facility: CLINIC | Age: 37
End: 2022-11-25
Payer: COMMERCIAL

## 2022-11-25 DIAGNOSIS — Z91.199 NO-SHOW FOR APPOINTMENT: Primary | ICD-10-CM

## 2022-11-25 NOTE — PROGRESS NOTES
This patient was a no show for this scheduled appointment. Patient briefly logged onto our video visit to report she wasn't feeling well. We rescheduled for December 02 2022.

## 2022-12-28 ENCOUNTER — VIRTUAL VISIT (OUTPATIENT)
Dept: NEUROLOGY | Facility: CLINIC | Age: 37
End: 2022-12-28
Payer: COMMERCIAL

## 2022-12-28 ENCOUNTER — TRANSFERRED RECORDS (OUTPATIENT)
Dept: HEALTH INFORMATION MANAGEMENT | Facility: CLINIC | Age: 37
End: 2022-12-28

## 2022-12-28 DIAGNOSIS — F33.0 MAJOR DEPRESSIVE DISORDER, RECURRENT EPISODE, MILD (H): Primary | ICD-10-CM

## 2022-12-28 PROCEDURE — 90834 PSYTX W PT 45 MINUTES: CPT | Mod: 95 | Performed by: PSYCHOLOGIST

## 2022-12-28 NOTE — PROGRESS NOTES
Psychology Progress Note    Date: December 29, 2022    Time length and type of treatment: 45 minutes (1:04 PM to 1:49 PM), individual therapy    After review of the patient's situation, this visit was changed from an in-person visit to a  video visit via [employer] to reduce the risk of COVID 19 exposure. Patient was informed that policies and procedures that govern in-person sessions would also apply to  video sessions. Patient was also informed that  video sessions would be discontinued when COVID 19 exposure is no longer a concern (as determined by [facility]).     Patient location: Patient home in [place], [place]  Provider location:  [facility]    Patient was in agreement with proceeding with a  video session.      Necessity: This session is necessary to address the patient's anxiety and depression.  Today we focus on revising the patient's treatment plan.  The reader is invited to review the patient's full treatment plan in the Media section of the patient's Epic medical record.    Psychotherapeutic Technique: This writer utilized motivational interviewing, active listening, reassurance and support in the context of cognitive behavioral therapy to address the above.      MENTAL STATUS EVALUATION  Grooming: Within normal limits  Attire: Appropriate  Age: Appears Stated  Behavior Towards Examiner: Cooperative  Motor Activity: Within normal limits  Eye Contact: Appropriate  Mood: Sad   Affect: full range  Speech/Language: normal  Attention: Normal  Concentration: Sufficient  Thought Process: unremarkable  Thought Content: Clear    Orientation: Appeared oriented to person, place, and time, though not formally tested  Memory: No evidence of impairment.  Judgement: Adequate  Estimated Intelligence: Above Average  Demonstrated Insight: Adequate  Fund of Knowledge: Adequate    Intervention:   Patient was readministered the PHQ-9 and JAH-7 as part of revising her treatment plan.   Her score of 10 on the PHQ-9 is suggestive of mild depression and is similar to her earlier score of 8.  Her score of 6 on the JAH-7 is suggestive of mild anxiety and is similar to her earlier score of 10.  Patient agreed that her depression has remained unchanged and opined that her anxiety is a little better.  She was able to identify strategies she utilizes to help her with anxiety management.  Patient attributed increased depression to several deaths that have occurred in the last couple of months.  She explained the most recent death was a close friend of her best friend and in addition to feeling her own sadness over his death, she has felt sad for her best friend's loss. Treatment plan was jointly revised and in remaining time we processed patient grief.     Progress:  Patient treatment plan was jointly revised    Plan:   We will meet again in 3 weeks to address the patient's depression and anxiety.  Estimated duration of treatment is 10+ individual therapy sessions (80532) at intervals of once every 3 weeks. Treatment is expected to be completed by October 2023.     Diagnosis:  Major Depressive Disorder, recurrent, mild  Generalized Anxiety Disorder

## 2023-01-03 ENCOUNTER — OFFICE VISIT (OUTPATIENT)
Dept: URGENT CARE | Facility: URGENT CARE | Age: 38
End: 2023-01-03
Payer: COMMERCIAL

## 2023-01-03 VITALS
HEART RATE: 98 BPM | OXYGEN SATURATION: 98 % | TEMPERATURE: 97.3 F | BODY MASS INDEX: 26.58 KG/M2 | DIASTOLIC BLOOD PRESSURE: 92 MMHG | HEIGHT: 63 IN | WEIGHT: 150 LBS | SYSTOLIC BLOOD PRESSURE: 139 MMHG

## 2023-01-03 DIAGNOSIS — K04.7 DENTAL INFECTION: Primary | ICD-10-CM

## 2023-01-03 PROCEDURE — 99213 OFFICE O/P EST LOW 20 MIN: CPT | Performed by: STUDENT IN AN ORGANIZED HEALTH CARE EDUCATION/TRAINING PROGRAM

## 2023-01-03 ASSESSMENT — ENCOUNTER SYMPTOMS
SHORTNESS OF BREATH: 0
RHINORRHEA: 0
WHEEZING: 0
SORE THROAT: 0
CHEST TIGHTNESS: 0
EYE PAIN: 0
EYE REDNESS: 0
FACIAL SWELLING: 1
TROUBLE SWALLOWING: 0

## 2023-01-03 NOTE — PROGRESS NOTES
"ASSESSMENT & PLAN:   Diagnoses and all orders for this visit:  Dental infection  -     amoxicillin-clavulanate (AUGMENTIN) 875-125 MG tablet; Take 1 tablet by mouth 2 times daily for 14 days    Dental infection - start augmentin x 14 days. Advised trying to get into dentist ASAP for root canal - patient provided with free/low-cost dental resources. Should use ice and OTC analgesics as needed for pain and swelling. RTC if new or worsening symptoms.     Return in about 1 week (around 1/10/2023) for persistent symptoms, sooner if needed.    At the end of the encounter, I discussed results, diagnosis, medications. Discussed red flags for immediate return to clinic/ER, as well as indications for follow up if no improvement. Patient and/or caregiver understood and agreed to plan. Patient was stable for discharge.      Patient Instructions     There is an emergency dental clinic at the [facility] in [place], which is open Monday through Friday. Please call [phone] and choose \"dental clinic\" option to schedule an appointment.     Additional Dental Clinics with no/reduced fees    [facility]   The Dental Emergency Room  [address] [facility] [address], [facility]  [phone]   Accepts [place]     [facility]  [address] , [facility]  [phone]   No Fee Hours and services vary each month - call them before going. Sometimes only offer extractions.     [facility]  [address], [facility]  [phone]   Sliding fee: ER visit - $50 up front. Regular - $35 up front   Call or arrive at 7:45 AM on Mondays, Tuesdays, or Thursdays to sign up for same-day appointments for dental pain / emergencies.     [facility]   [address] [place] [address], [facility]   [phone]   Sliding fee. Walk-in's accepted Monday-Friday 8-11AM and 1-4PM    [facility]   [address], [facility]   [phone]   Sliding fee. If no insurance, call first.     [facility] [place]  [address], [facility]  [phone]   Sliding fee. Call 8:00 AM Mon-Thurs for next-day appointments (for emergencies/pain only). Help with [place] is available.     [facility]  [address] [facility], [facility]  [phone]   Call for fees. Walk-in appointments available from 8am-3:30pm. Standard appointments also available.       [facility]  [address]  [phone]   No cost .Open Monday- Thursday, 8am-9pm     [facility]   [address] [facility]  [phone]   Sliding fee - $40 up front. No walk-ins. Call at 8AM Mon-Fri for same- day visits. Can schedule Saturday emergency visits by calling Friday morning.     [facility]  [address] [facility]  [phone]   Sliding fee. No walk-ins. For emergency appointments: Call on Thursday 3PM (for Friday appt) OR Call on Friday 3PM (for Monday appt).    [facility]  [address] [facility]  [phone]   Sliding fee    [facility]  [address] [facility]  [phone]   Sliding fee. Call for details Mon, Tues, Thurs, Fri- 8am-4:30 PM. Wed 8:30 AM to 8 pm       OTHER RESOURCES   [facility],   [facility] [address]  [address] [place] [address]  [phone]    Referral Service, 1-800-DENTIST   Open 9a to 9p 7 days a week    [employer], 1-133.984.3185 For people who are elderly, disabled, or medically compromised and have no other way to pay for dental care. Call to get an application. If approved, services are provided through volunteer dentists.         ------------------------------------------------------------------------  SUBJECTIVE  Patient presents with:  Urgent Care: Pt is in clinic for eval on lt side of face that is swollen from 10am this morning, swelling was close to eye and is moving downwards, there is a small purple bruise on left  eyelid, pressure in face     HPI  Yocasta BAE Kamilla is a(n) 37 year old female presenting to clinic today for left side facial swelling that began this morning. No injury. Reports that she noticed a small purple spot on left eyelid yesterday that she thought was due to sleep deprivation. Left cheek is swollen. Has some left-sided gum swelling. She needs a root canal of left upper tooth. No lip, tongue, or throat swelling. No difficulty breathing. No exposure to allergens including cats, dust, or pollen.     Review of Systems   HENT: Positive for dental problem and facial swelling. Negative for congestion, rhinorrhea, sore throat and trouble swallowing.    Eyes: Negative for pain and redness.   Respiratory: Negative for chest tightness, shortness of breath and wheezing.        Current Outpatient Medications   Medication Sig Dispense Refill     amoxicillin-clavulanate (AUGMENTIN) 875-125 MG tablet Take 1 tablet by mouth 2 times daily for 14 days 28 tablet 0     JUNEL FE 1/20 1-20 MG-MCG tablet TAKE 1 TABLET BY MOUTH EVERY DAY 84 tablet 0     amitriptyline (ELAVIL) 25 MG tablet Take 1-2 tablets (25-50 mg) by mouth At Bedtime (Patient not taking: Reported on 1/3/2023) 60 tablet 1     carboxymethylcellulose (CARBOXYMETHYLCELLULOSE SODIUM) 0.5 % SOLN ophthalmic solution Place 1 drop into both eyes 3 times daily as needed for dry eyes (Patient not taking: Reported on 1/3/2023) 15 mL 11     Problem List:  2014-08: Abnormal Pap smear of cervix- 2010 Russell was normal  2014-08: Tobacco abuse  2014-08: Eczema  2014-08: Seasonal allergies  2014-08: Oral contraceptive use  2014-08: Vitamin D deficiency    Allergies   Allergen Reactions     Animal Dander      Pollen Extract          OBJECTIVE  Vitals:    01/03/23 1531   BP: (!) 139/92   BP Location: Left arm   Patient Position: Sitting   Cuff Size: Adult Regular   Pulse: 98   Temp: 97.3  F (36.3  C)   TempSrc: Temporal   SpO2: 98%   Weight: 68 kg (150 lb)   Height: 1.594 m (5'  "2.75\")     Physical Exam   GENERAL: healthy, alert, no acute distress.   HEAD: normocephalic, atraumatic. Left upper cheek swelling compared to right. Tender to palpation of left cheek along upper teeth.  EYE: PERRL. EOMs intact. No scleral injection bilaterally. Eyelids normal without open skin or bruising.   NOSE: external nose atraumatic without lesions.  OROPHARYNX: moist mucous membranes. No angioedema. Poor dentition. Left upper gums edematous. No drainage. No tonsillar enlargement, erythema or exudate. Airway not compromised. Uvula midline.     No results found for any visits on 01/03/23.  "

## 2023-01-03 NOTE — PATIENT INSTRUCTIONS
"  There is an emergency dental clinic at the [facility] in [place], which is open Monday through Friday. Please call [phone] and choose \"dental clinic\" option to schedule an appointment.     Additional Dental Clinics with no/reduced fees    [facility]   The Dental Emergency Room  [address], [facility]  [phone]   Accepts MA     [facility]  [address] , [facility]  [phone]   No Fee Hours and services vary each month - call them before going. Sometimes only offer extractions.     [facility]  [address] , [facility]  [phone]   Sliding fee: ER visit - $50 up front. Regular - $35 up front   Call or arrive at 7:45 AM on Mondays, Tuesdays, or Thursdays to sign up for same-day appointments for dental pain / emergencies.     [facility]   [address], [facility]   [phone]   Sliding fee. Walk-in's accepted Monday-Friday 8-11AM and 1-4PM    [facility]   [address], [facility]   [phone]   Sliding fee. If no insurance, call first.     [facility]  [address], [facility]  [phone]   Sliding fee. Call 8:00 AM Mon-Thurs for next-day appointments (for emergencies/pain only). Help with [place] is available.     [facility]  [address], [facility]  [phone]   Call for fees. Walk-in appointments available from 8am-3:30pm. Standard appointments also available.       [facility]  [address] [place] [address]  [phone]   No cost .Open Monday- Thursday, 8am-9pm     [facility]   [address]  [phone]   Sliding fee - $40 up front. No walk-ins. Call at 8AM Mon-Fri for same- day visits. Can schedule Saturday emergency visits by calling Friday morning.     [facility]  [address]  [phone]   Sliding fee. No walk-ins. For emergency appointments: " Call on Thursday 3PM (for Friday appt) OR Call on Friday 3PM (for Monday appt).    [facility]  [address] [facility]  [phone]   Sliding fee    [facility]  [address] [facility]  [phone]   Sliding fee. Call for details Mon, Tues, Thurs, Fri- 8am-4:30 PM. Wed 8:30 AM to 8 pm       OTHER RESOURCES   Emergency Dental Care [facility],   [address] [place] [address]  [address] [place] [address]  [phone]    Referral Service, 0-800-DENTIST   Open 9a to 9p 7 days a week    [employer], 1-879.273.5251 For people who are elderly, disabled, or medically compromised and have no other way to pay for dental care. Call to get an application. If approved, services are provided through volunteer dentists.

## 2023-01-23 ENCOUNTER — OFFICE VISIT (OUTPATIENT)
Dept: URGENT CARE | Facility: URGENT CARE | Age: 38
End: 2023-01-23
Payer: COMMERCIAL

## 2023-01-23 VITALS
OXYGEN SATURATION: 99 % | RESPIRATION RATE: 16 BRPM | TEMPERATURE: 97.9 F | DIASTOLIC BLOOD PRESSURE: 80 MMHG | HEIGHT: 63 IN | BODY MASS INDEX: 26.58 KG/M2 | WEIGHT: 150 LBS | HEART RATE: 97 BPM | SYSTOLIC BLOOD PRESSURE: 135 MMHG

## 2023-01-23 DIAGNOSIS — K04.7 TOOTH INFECTION: Primary | ICD-10-CM

## 2023-01-23 PROCEDURE — 99213 OFFICE O/P EST LOW 20 MIN: CPT | Performed by: PHYSICIAN ASSISTANT

## 2023-01-23 RX ORDER — FLUCONAZOLE 150 MG/1
150 TABLET ORAL
Qty: 3 TABLET | Refills: 0 | Status: SHIPPED | OUTPATIENT
Start: 2023-01-23 | End: 2023-01-30

## 2023-01-23 NOTE — PROGRESS NOTES
Tooth infection  - fluconazole (DIFLUCAN) 150 MG tablet; Take 1 tablet (150 mg) by mouth every 3 days for 3 doses  - amoxicillin-clavulanate (AUGMENTIN) 875-125 MG tablet; Take 1 tablet by mouth 2 times daily for 10 days  - acetaminophen-codeine (TYLENOL #3) 300-30 MG tablet; Take 1 tablet by mouth every 6 hours as needed for severe pain (7-10)    Follow-up with dentist in next 1 to 2 weeks for possible tooth extraction.       Dental Abscess  An abscess is a sac of fluid (pus). A dental abscess forms when a tooth or the tissue around it becomes infected with bacteria. The bacteria can enter through a cavity or a crack in a tooth. It can also infect the gum tissue or bone around a tooth. An untreated abscess can cause the loss of the tooth. It can even spread to other parts of the body and become life-threatening.   Symptoms of a dental abscess   Symptoms include:    Toothache, often severe    Tooth pain with hot, cold, or pressure    Pain in the gums, cheek, or jaw    Bad breath or bitter taste in the mouth    Trouble swallowing or opening the mouth    Fever    Swollen or enlarged neck glands  Diagnosing a dental abscess  The dentist will ask about your symptoms and check your teeth and gums. You will be told if you need any tests, such as dental X-rays.   Treating a dental abscess  Treatments for a dental abscess may include:     Antibiotic medicines. These treat the underlying infection.    Pain relievers. These help you feel more comfortable. Your healthcare provider may prescribe a medicine for you. Or you may use over-the-counter pain relievers, such as acetaminophen or ibuprofen.    Warm saltwater rinses. These can soothe mild pain and help clear away pus.    Root canal surgery.  This may be done if needed to save the tooth. With a root canal, the infected part of the tooth is removed. A special substance is then used to fill the empty space in the tooth.    Draining the abscess. This may be done if needed.  Cuts (incisions) are made to let the infected material drain from the tooth.    Removing the tooth. This is done in cases of severe infection that can t be treated another way.  You may need to be admitted to a hospital if the infection is severe, has spread, or doesn t respond to treatment.   When to get medical advice  Call your dentist right away if you have any of the following:     Fever of 100.4 F  ( 38 C) or higher    More pain, redness, drainage, or swelling in the treated area    Face or jawbone swelling    Pain that can't be controlled with medicines  Preventing dental abscess  To prevent another abscess in the future, keep your teeth clean and healthy. Brush twice a day and floss at least once daily. See your dentist for regular exams and tooth cleanings. And stay away from sugary foods and drinks that can lead to tooth decay.   [employer] last reviewed this educational content on 2/1/2020 2000-2021 [employer]. All rights reserved. This information is not intended as a substitute for professional medical care. Always follow your healthcare professional's instructions.                 Gokul Rutherford PA-C  [facility]    Subjective   38 year old who presents to clinic today for the following health issues:    Urgent Care and Vaginal Problem       HPI     Took Augmentin for 2 weeks and finished Thursday morning for an apt on the 3rd, thursday evening pt started having a toothache, has been using tylenol/codeine. Starting this weekend pt developed a yeast infection, itching, burning,    Review of Systems   Review of Systems   See HPI    Objective    Temp: 97.9  F (36.6  C) Temp src: Oral BP: 135/80 Pulse: 97   Resp: 16 SpO2: 99 %       Physical Exam   Physical Exam  Constitutional:       General: She is not in acute distress.     Appearance: Normal appearance. She is normal weight. She is not ill-appearing, toxic-appearing or diaphoretic.   HENT:      Head: Normocephalic  and atraumatic.      Mouth/Throat:      Dentition: Dental tenderness and gingival swelling present. No dental caries or dental abscesses.     Cardiovascular:      Rate and Rhythm: Normal rate.      Pulses: Normal pulses.   Pulmonary:      Effort: Pulmonary effort is normal. No respiratory distress.   Neurological:      Mental Status: She is alert.   Psychiatric:         Mood and Affect: Mood normal.         Behavior: Behavior normal.         Thought Content: Thought content normal.         Judgment: Judgment normal.          No results found for this or any previous visit (from the past 24 hour(s)).

## 2023-02-08 ENCOUNTER — VIRTUAL VISIT (OUTPATIENT)
Dept: NEUROLOGY | Facility: CLINIC | Age: 38
End: 2023-02-08
Payer: COMMERCIAL

## 2023-02-08 DIAGNOSIS — F33.0 MAJOR DEPRESSIVE DISORDER, RECURRENT EPISODE, MILD (H): Primary | ICD-10-CM

## 2023-02-08 PROCEDURE — 90834 PSYTX W PT 45 MINUTES: CPT | Mod: VID | Performed by: PSYCHOLOGIST

## 2023-02-08 NOTE — PROGRESS NOTES
Psychology Progress Note    Date: February 8, 2023    Time length and type of treatment: 52 minutes (1:03 PM - 1:55 PM), individual therapy    After review of the patient's situation, this visit was changed from an in-person visit to a  video visit via [employer] (and [employer] when Skip froze) to reduce the risk of COVID 19 exposure. Patient was informed that policies and procedures that govern in-person sessions would also apply to  video sessions. Patient was also informed that  video sessions would be discontinued when COVID 19 exposure is no longer a concern (as determined by [facility]).     Patient location: Patient home in [place], [place]  Provider location:  [facility] - Provider remote location     Patient was in agreement with proceeding with a video session.      Necessity: This session is necessary to address the patient's depression and anxiety. Today we focus on the patient's treatment plan, specifically exploring sleep hygiene. The reader is invited to review the patient's full treatment plan in the Media section of the patient's Epic medical record.    Psychotherapeutic Technique: This writer utilized motivational interviewing, active listening, reassurance and support in the context of cognitive behavioral therapy to address the above.      MENTAL STATUS EVALUATION  Grooming: Within normal limits  Attire: Appropriate  Age: Appears Stated  Behavior Towards Examiner: Cooperative  Motor Activity: Within normal limits  Eye Contact: Appropriate  Mood: Depressed   Affect: full range  Speech/Language: normal  Attention: Normal  Concentration: Sufficient  Thought Process: unremarkable  Thought Content: Clear    Orientation: Appeared oriented to person, place, and time, though not formally established  Memory: No evidence of impairment.  Judgement: Adequate  Estimated Intelligence: Above Average  Demonstrated Insight: Adequate  Fund of Knowledge: Adequate    Intervention:    Patient reported the cold weather caused her to spend about 10 days without leaving her home, and she noticed her depression increased as a result.  We discussed activities patient might engage in despite the cold and generated a list of several things patient reported she would enjoy.  Patient reported her sleep cycle got even more dysregulated, with her tending to take a 4 hour nap each day, both because she was tired from a poor night of sleep and because she was bored.  We discussed sleep hygiene strategies including reducing her nap time, exposing herself to sunlight in the morning, physical exercise earlier in the day, and  taking her melatonin earlier in the evening.     Progress:  Patient reported an intention to work on sleep hygiene and increasing her activity.    Plan:   We will meet again in 3 weeks to address the patient's depression and anxiety.  Estimated duration of treatment is 10+ individual therapy sessions (89456) at intervals of once every 3 weeks. Treatment is expected to be completed by October 2023.     Diagnosis:  Major Depressive Disorder, recurrent, mild  Generalized Anxiety Disorder

## 2023-03-01 ENCOUNTER — VIRTUAL VISIT (OUTPATIENT)
Dept: NEUROLOGY | Facility: CLINIC | Age: 38
End: 2023-03-01
Payer: COMMERCIAL

## 2023-03-01 DIAGNOSIS — F33.0 MAJOR DEPRESSIVE DISORDER, RECURRENT EPISODE, MILD (H): Primary | ICD-10-CM

## 2023-03-01 PROCEDURE — 90834 PSYTX W PT 45 MINUTES: CPT | Mod: VID | Performed by: PSYCHOLOGIST
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
## 2023-03-22 ENCOUNTER — VIRTUAL VISIT (OUTPATIENT)
Dept: NEUROLOGY | Facility: CLINIC | Age: 38
End: 2023-03-22
Payer: COMMERCIAL

## 2023-03-22 DIAGNOSIS — F33.0 MAJOR DEPRESSIVE DISORDER, RECURRENT EPISODE, MILD (H): Primary | ICD-10-CM

## 2023-03-22 PROCEDURE — 90834 PSYTX W PT 45 MINUTES: CPT | Mod: VID | Performed by: PSYCHOLOGIST

## 2023-03-22 NOTE — PROGRESS NOTES
Psychology Progress Note    Date: March 22, 2023    Time length and type of treatment: 42 minutes (1:05 PM to 1:47 PM), individual therapy    After review of the patient's situation, this visit was changed from an in-person visit to a  video visit via [employer] Patient was informed that policies and procedures that govern in-person sessions would also apply to  video sessions. Patient was in agreement with proceeding with a  video session.    Patient Location: Patient home in [place], [place]  Provider Location:  [facility] - Provider remote location     Necessity: This session is necessary to address the patient's depression and anxiety.  Today we focus on the patient's treatment plan, specifically exploring coping strategies and processing grief.  The reader is invited to review the patient's full treatment plan in the Media section of the patient's Epic medical record.    Psychotherapeutic Technique: This writer utilized motivational interviewing, active listening, reassurance and support in the context of cognitive behavioral therapy to address the above.      Mental Status Evaluation:  Grooming: Within normal limits  Attire: Appropriate  Age: Appears Stated  Behavior Towards Examiner: Cooperative  Motor Activity: Within normal limits  Eye Contact: Appropriate  Mood: Sad   Affect: tearful  Speech/Language: normal  Attention: Normal  Concentration: Sufficient  Thought Process: tangential  Thought Content: Clear    Orientation: Appeared oriented to person, place, and time, though not formally established  Memory: No evidence of impairment.  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  She acknowledged that the isolation of COVID has her thinking even more about her patient, and she has been  struggling with feeling overwhelmed and sad.  She has also been in a great deal of pain.  Patient was provided supportive psychotherapy as she discussed her client, and a pain management strategy was explained and practiced in session. Patient reported she was able to reduce her pain modestly with this strategy, and she was encouraged to continue practicing.  Patient reported a slightly improved relationship with her mom, and this positive change was validated and reinforced.     Progress:  Patient has shown improvement in ability to utilize coping skills.     Plan:   We will meet again in 3 weeks to address the patient's depression and anxiety.  Estimated duration of treatment is 10+ individual therapy sessions (85756) at intervals of once every 3 weeks. Treatment is expected to be completed by October 2023.     Diagnosis:  Major Depressive Disorder, recurrent, mild  Generalized Anxiety Disorder

## 2023-04-24 ENCOUNTER — VIRTUAL VISIT (OUTPATIENT)
Dept: NEUROLOGY | Facility: CLINIC | Age: 38
End: 2023-04-24
Payer: COMMERCIAL

## 2023-04-24 DIAGNOSIS — F33.1 MAJOR DEPRESSIVE DISORDER, RECURRENT EPISODE, MODERATE (H): Primary | ICD-10-CM

## 2023-04-24 PROCEDURE — 90834 PSYTX W PT 45 MINUTES: CPT | Mod: VID | Performed by: PSYCHOLOGIST

## 2023-04-24 NOTE — PROGRESS NOTES
Psychology Progress Note    Date: April 24, 2023    Time length and type of treatment: 41 minutes (3:05 PM to 3:46 PM), individual therapy    After review of the patient's situation, this visit was changed from an in-person visit to a  video visit via [employer] Patient was informed that policies and procedures that govern in-person sessions would also apply to  video sessions. Patient was in agreement with proceeding with a  video session.    Patient Location: Patient home in [place], [place]  Provider Location:  [facility] - Provider remote location     Necessity: This session is necessary to address the patient's depression and anxiety.  Today we focus on revising the patient's treatment plan. The reader is invited to review the patient's full treatment plan in the Media section of the patient's Epic medical record.    Psychotherapeutic Technique: This writer utilized motivational interviewing, active listening, reassurance and support in the context of cognitive behavioral therapy to address the above.      Mental Status Evaluation:  Grooming: Within normal limits  Attire: Appropriate  Age: Appears Stated  Behavior Towards Examiner: Cooperative  Motor Activity: Within normal limits  Eye Contact: Appropriate  Mood: Sad   Affect: tearful  Speech/Language: normal  Attention: Normal  Concentration: Sufficient  Thought Process: unremarkable  Thought Content: Clear    Orientation: Appeared oriented to person, place, and time, though not formally established  Memory: No evidence of impairment.  Judgement: Adequate  Estimated Intelligence: Within normal limits  Demonstrated Insight: Adequate  Fund of Knowledge: Adequate    Intervention:   Patient was readministered the PHQ-9 and JAH-7 as part of revising her treatment plan.  Her score of 7 on the JAH-7 is suggestive of mild anxiety and is similar to her earlier score of 6.  Her score of 13 on the PHQ-9 is suggestive of moderate depression and  is a modest worsening relative to her previous score of 10.  Patient agreed this was accurate and reported she has been having a difficult time because her dad, who has been visiting from [place] and who has been her more accepting parent, has been critical of the patient's alcohol consumption and her failure to obtain an ID. Patient acknowledges regular intoxication, but indicated that she isn't an angry drunk and this is not something she considers a problem.  While an Alcohol Use Disorder is likely, a clinical decision was made to not assess further in the interest of preserving rapport. However, motivational interviewing will be utilized as appropriate to help patient move into the contemplation stage of change and this may eventually become an additional focus of clinical attention.      Progress:  Patient treatment plan was jointly revised.     Plan:   We will meet again in 3 weeks to address the patient's depression and anxiety.  Estimated duration of treatment is 10+ individual therapy sessions (04934) at intervals of once every 3 weeks. Treatment is expected to be completed by May 2024.     Diagnosis:  Major Depressive Disorder, recurrent, moderate  Generalized Anxiety Disorder

## 2023-07-17 ENCOUNTER — VIRTUAL VISIT (OUTPATIENT)
Dept: NEUROLOGY | Facility: CLINIC | Age: 38
End: 2023-07-17
Payer: COMMERCIAL

## 2023-07-17 DIAGNOSIS — F33.1 MAJOR DEPRESSIVE DISORDER, RECURRENT EPISODE, MODERATE (H): Primary | ICD-10-CM

## 2023-07-17 PROCEDURE — 90834 PSYTX W PT 45 MINUTES: CPT | Mod: 95 | Performed by: PSYCHOLOGIST

## 2023-07-17 NOTE — PROGRESS NOTES
Psychology Progress Note    Date: July 17, 2023    Time length and type of treatment: 43 minutes (2:03 PM - 2:46 PM), individual therapy    After review of the patient's situation, this visit was changed from an in-person visit to a  video visit via [employer]. Patient was informed that policies and procedures that govern in-person sessions would also apply to  video sessions. Patient was in agreement with proceeding with a  video session.    Patient Location: Patient home in [place], [place]  Provider Location:  [facility] - Provider remote location     Necessity: This session is necessary to address the patient's depression and anxiety. Today we focus on the patient's treatment plan, specifically exploring sleep hygiene and thoughts and expectations of self and others. The reader is invited to review the patient's full treatment plan in the Media section of the patient's Epic medical record.    Psychotherapeutic Technique: This writer utilized motivational interviewing, active listening, reassurance and support in the context of cognitive behavioral therapy to address the above.      Mental Status Evaluation:  Grooming: Within normal limits  Attire: Appropriate  Age: Appears Stated  Behavior Towards Examiner: Cooperative  Motor Activity: Within normal limits  Eye Contact: Appropriate  Mood: Sad   Affect: full range  Speech/Language: normal  Attention: Normal  Concentration: Sufficient  Thought Process: unremarkable  Thought Content: Clear    Orientation: Appeared oriented to person, place, and time, though not formally established  Memory: No evidence of impairment.  Judgement: Adequate  Estimated Intelligence: Within normal limits  Demonstrated Insight: Adequate  Fund of Knowledge: Adequate    Intervention:   Patient reported that she has been feeling more emotional and has been crying small things, including commercials. She articulated a plan to speak with her provider about an  antidepressant and this plan was supported.  Patient also reported that she has been struggling with sleep and we reviewed sleep hygiene strategies.  We also discussed how anxiety is interfering with sleep and a strategy to write her problems down and give herself permission to stop thinking about them until morning was discussed.  Patient expressed openness to trying this.  Patient has accomplished several tasks, including finishing her taxes, scheduling a much needed dental appointment, and scheduling past due medical appointments, and this positive progress was validated and reinforced.     Progress:  Affect lightened as the session progressed.    Plan:   We will meet again in 3 weeks to address the patient's depression and anxiety.  Estimated duration of treatment is 10+ individual therapy sessions (72986) at intervals of once every 3 weeks. Treatment is expected to be completed by May 2024.     Diagnosis:  Major Depressive Disorder, recurrent, moderate  Generalized Anxiety Disorder

## 2023-08-11 ENCOUNTER — OFFICE VISIT (OUTPATIENT)
Dept: FAMILY MEDICINE | Facility: CLINIC | Age: 38
End: 2023-08-11
Payer: COMMERCIAL

## 2023-08-11 VITALS
OXYGEN SATURATION: 97 % | HEIGHT: 63 IN | RESPIRATION RATE: 16 BRPM | DIASTOLIC BLOOD PRESSURE: 78 MMHG | HEART RATE: 102 BPM | TEMPERATURE: 97.6 F | WEIGHT: 157.4 LBS | SYSTOLIC BLOOD PRESSURE: 130 MMHG | BODY MASS INDEX: 27.89 KG/M2

## 2023-08-11 DIAGNOSIS — F33.0 MILD EPISODE OF RECURRENT MAJOR DEPRESSIVE DISORDER (H): ICD-10-CM

## 2023-08-11 DIAGNOSIS — Z11.3 SCREEN FOR STD (SEXUALLY TRANSMITTED DISEASE): ICD-10-CM

## 2023-08-11 DIAGNOSIS — R00.2 PALPITATIONS: ICD-10-CM

## 2023-08-11 DIAGNOSIS — Z00.00 ROUTINE GENERAL MEDICAL EXAMINATION AT A HEALTH CARE FACILITY: Primary | ICD-10-CM

## 2023-08-11 DIAGNOSIS — Z13.220 SCREENING FOR LIPID DISORDERS: ICD-10-CM

## 2023-08-11 DIAGNOSIS — Z11.4 SCREENING FOR HIV (HUMAN IMMUNODEFICIENCY VIRUS): ICD-10-CM

## 2023-08-11 DIAGNOSIS — Z11.59 NEED FOR HEPATITIS C SCREENING TEST: ICD-10-CM

## 2023-08-11 LAB
ALBUMIN SERPL BCG-MCNC: 4.1 G/DL (ref 3.5–5.2)
ALP SERPL-CCNC: 74 U/L (ref 35–104)
ALT SERPL W P-5'-P-CCNC: 14 U/L (ref 0–50)
ANION GAP SERPL CALCULATED.3IONS-SCNC: 11 MMOL/L (ref 7–15)
AST SERPL W P-5'-P-CCNC: 26 U/L (ref 0–45)
BILIRUB SERPL-MCNC: 0.3 MG/DL
BUN SERPL-MCNC: 4.8 MG/DL (ref 6–20)
CALCIUM SERPL-MCNC: 8.9 MG/DL (ref 8.6–10)
CHLORIDE SERPL-SCNC: 104 MMOL/L (ref 98–107)
CHOLEST SERPL-MCNC: 198 MG/DL
CREAT SERPL-MCNC: 0.51 MG/DL (ref 0.51–0.95)
DEPRECATED HCO3 PLAS-SCNC: 22 MMOL/L (ref 22–29)
ERYTHROCYTE [DISTWIDTH] IN BLOOD BY AUTOMATED COUNT: 13 % (ref 10–15)
GFR SERPL CREATININE-BSD FRML MDRD: >90 ML/MIN/1.73M2
GLUCOSE SERPL-MCNC: 102 MG/DL (ref 70–99)
HCT VFR BLD AUTO: 43.3 % (ref 35–47)
HDLC SERPL-MCNC: 62 MG/DL
HGB BLD-MCNC: 14.1 G/DL (ref 11.7–15.7)
LDLC SERPL CALC-MCNC: 112 MG/DL
MCH RBC QN AUTO: 31.2 PG (ref 26.5–33)
MCHC RBC AUTO-ENTMCNC: 32.6 G/DL (ref 31.5–36.5)
MCV RBC AUTO: 96 FL (ref 78–100)
NONHDLC SERPL-MCNC: 136 MG/DL
PLATELET # BLD AUTO: 251 10E3/UL (ref 150–450)
POTASSIUM SERPL-SCNC: 4 MMOL/L (ref 3.4–5.3)
PROT SERPL-MCNC: 6.9 G/DL (ref 6.4–8.3)
RBC # BLD AUTO: 4.52 10E6/UL (ref 3.8–5.2)
SODIUM SERPL-SCNC: 137 MMOL/L (ref 136–145)
T PALLIDUM AB SER QL: NONREACTIVE
TRIGL SERPL-MCNC: 121 MG/DL
TSH SERPL DL<=0.005 MIU/L-ACNC: 2.27 UIU/ML (ref 0.3–4.2)
WBC # BLD AUTO: 7.6 10E3/UL (ref 4–11)

## 2023-08-11 PROCEDURE — 87491 CHLMYD TRACH DNA AMP PROBE: CPT

## 2023-08-11 PROCEDURE — 36415 COLL VENOUS BLD VENIPUNCTURE: CPT

## 2023-08-11 PROCEDURE — 80061 LIPID PANEL: CPT

## 2023-08-11 PROCEDURE — 84443 ASSAY THYROID STIM HORMONE: CPT

## 2023-08-11 PROCEDURE — 80053 COMPREHEN METABOLIC PANEL: CPT

## 2023-08-11 PROCEDURE — 99395 PREV VISIT EST AGE 18-39: CPT

## 2023-08-11 PROCEDURE — 87591 N.GONORRHOEAE DNA AMP PROB: CPT

## 2023-08-11 PROCEDURE — 85027 COMPLETE CBC AUTOMATED: CPT

## 2023-08-11 PROCEDURE — 99214 OFFICE O/P EST MOD 30 MIN: CPT | Mod: 25

## 2023-08-11 PROCEDURE — 96127 BRIEF EMOTIONAL/BEHAV ASSMT: CPT

## 2023-08-11 PROCEDURE — 86803 HEPATITIS C AB TEST: CPT

## 2023-08-11 PROCEDURE — 86780 TREPONEMA PALLIDUM: CPT

## 2023-08-11 PROCEDURE — 87389 HIV-1 AG W/HIV-1&-2 AB AG IA: CPT

## 2023-08-11 RX ORDER — SERTRALINE HYDROCHLORIDE 25 MG/1
25 TABLET, FILM COATED ORAL DAILY
Qty: 90 TABLET | Refills: 1 | Status: SHIPPED | OUTPATIENT
Start: 2023-08-11 | End: 2024-06-17

## 2023-08-11 ASSESSMENT — ENCOUNTER SYMPTOMS
HEMATURIA: 0
HEARTBURN: 1
SORE THROAT: 0
SHORTNESS OF BREATH: 1
CONSTIPATION: 1
HEMATOCHEZIA: 1
NERVOUS/ANXIOUS: 1
DIZZINESS: 0
ABDOMINAL PAIN: 0
COUGH: 1
PARESTHESIAS: 0
PALPITATIONS: 1
JOINT SWELLING: 0
MYALGIAS: 0
ARTHRALGIAS: 0
FEVER: 0
HEADACHES: 1
NAUSEA: 0
FREQUENCY: 0
EYE PAIN: 0
CHILLS: 0
DIARRHEA: 0
WEAKNESS: 0
DYSURIA: 0

## 2023-08-11 ASSESSMENT — PATIENT HEALTH QUESTIONNAIRE - PHQ9
SUM OF ALL RESPONSES TO PHQ QUESTIONS 1-9: 7
SUM OF ALL RESPONSES TO PHQ QUESTIONS 1-9: 7
10. IF YOU CHECKED OFF ANY PROBLEMS, HOW DIFFICULT HAVE THESE PROBLEMS MADE IT FOR YOU TO DO YOUR WORK, TAKE CARE OF THINGS AT HOME, OR GET ALONG WITH OTHER PEOPLE: SOMEWHAT DIFFICULT
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
## 2023-08-12 LAB
C TRACH DNA SPEC QL NAA+PROBE: NEGATIVE
N GONORRHOEA DNA SPEC QL NAA+PROBE: NEGATIVE

## 2023-08-14 LAB
HCV AB SERPL QL IA: NONREACTIVE
HIV 1+2 AB+HIV1 P24 AG SERPL QL IA: NONREACTIVE

## 2023-09-01 ENCOUNTER — VIRTUAL VISIT (OUTPATIENT)
Dept: NEUROLOGY | Facility: CLINIC | Age: 38
End: 2023-09-01
Payer: COMMERCIAL

## 2023-09-01 DIAGNOSIS — F41.1 GAD (GENERALIZED ANXIETY DISORDER): Primary | ICD-10-CM

## 2023-09-01 PROCEDURE — 90834 PSYTX W PT 45 MINUTES: CPT | Mod: 95 | Performed by: PSYCHOLOGIST

## 2023-09-01 NOTE — PROGRESS NOTES
Psychology Progress Note    Date: September 1, 2023    Time length and type of treatment: 45 minutes (3:02 PM to 3:47 PM), individual therapy    After review of the patient's situation, this visit was changed from an in-person visit to a video visit via [employer]. Patient was informed that policies and procedures that govern in-person sessions would also apply to video sessions. Patient was in agreement with proceeding with a video session.    Patient Location: [place], [place]  Provider Location:  [facility] - Provider remote location     Necessity: This session is necessary to address the patient's depression and anxiety.  Today we focus on revising the patient's treatment plan. The reader is invited to review the patient's full treatment plan in the Media section of the patient's Epic medical record.    Psychotherapeutic Technique: This writer utilized motivational interviewing, active listening, reassurance and support in the context of cognitive behavioral therapy to address the above.      Mental Status Evaluation:  Grooming: Within normal limits  Attire: Appropriate  Age: Appears Stated  Behavior Towards Examiner: Cooperative  Motor Activity: Within normal limits  Eye Contact: Appropriate  Mood: Anxious   Affect: full range  Speech/Language: normal  Attention: Normal  Concentration: Sufficient  Thought Process: unremarkable  Thought Content: Clear    Orientation: Appeared oriented to person, place, and time, though not formally established  Memory: No evidence of impairment.  Judgement: Adequate  Estimated Intelligence: Within normal limits  Demonstrated Insight: Adequate  Fund of Knowledge: Adequate    Intervention:   Patient was readministered the PHQ-9 and JAH-7 as part of revising her treatment plan.  Her score of 8 on the PHQ-9 is suggestive of mild depression and is an improvement over her earlier moderate score of 13.  Her score of 9 on the JAH-7 is suggestive of mild  depression and is similar to her earlier score of 7.  Patient agreed this is accurate and attributed improved depression score to relief at having been hired for a part-time job that she anticipates will alleviate some of her financial stressors.  Treatment plan was jointly revised and in remaining time we discussed a recent episode of excessive alcohol consumption, patient's subsequent efforts to how much she spends on alcohol, and motivational interviewing was utilized to reinforce positive change.    Progress:  Patient treatment plan was jointly revised    Plan:   We will meet again in 3 weeks to address the patient's depression and anxiety.  Estimated duration of treatment is 10+ individual therapy sessions (53725) at intervals of once every 3 weeks. Treatment is expected to last at least until April 2024 with reassessment at that time.    Diagnosis:  Generalized Anxiety Disorder  Major Depressive Disorder, recurrent, mild

## 2023-11-01 ENCOUNTER — VIRTUAL VISIT (OUTPATIENT)
Dept: NEUROLOGY | Facility: CLINIC | Age: 38
End: 2023-11-01
Payer: COMMERCIAL

## 2023-11-01 DIAGNOSIS — F33.0 MAJOR DEPRESSIVE DISORDER, RECURRENT EPISODE, MILD (H): Primary | ICD-10-CM

## 2023-11-01 PROCEDURE — 90834 PSYTX W PT 45 MINUTES: CPT | Mod: 95 | Performed by: PSYCHOLOGIST

## 2023-11-01 NOTE — PROGRESS NOTES
Psychology Progress Note    Date: November 1, 2023    Time length and type of treatment: 41 minutes (11:09 AM to 11:50 AM), individual therapy    After review of the patient's situation, this visit was changed from an in-person visit to a video visit via [employer]. Patient was informed that policies and procedures that govern in-person sessions would also apply to video sessions. Patient was in agreement with proceeding with a video session.    Patient Location: [place], [place]  Provider Location:  [facility]    Necessity: This session is necessary to address the patient's depression and anxiety.  Today we focus on the patient's treatment plan, specifically exploring increasing involvement in meaningful activities.  The reader is invited to review the patient's full treatment plan in the Media section of the patient's Epic medical record.    Psychotherapeutic Technique: This writer utilized motivational interviewing, active listening, reassurance and support in the context of cognitive behavioral therapy to address the above.      Mental Status Evaluation:  Grooming: Within normal limits  Attire: Appropriate  Age: Appears Stated  Behavior Towards Examiner: Cooperative  Motor Activity: Within normal limits  Eye Contact: Appropriate  Mood: Depressed   Affect: full range  Speech/Language: normal  Attention: Normal  Concentration: Sufficient  Thought Process: unremarkable  Thought Content: Clear    Orientation: Appeared oriented to person, place, and time, though not formally established  Memory: No evidence of impairment.  Judgement: Adequate  Estimated Intelligence: Within normal limits  Demonstrated Insight: Adequate  Fund of Knowledge: Adequate    Intervention:   Patient reported she didn't get the  job she had assumed she'd get and became even more depressed as a result. She acknowledged that she hasn't done any job hunting since then and has had to borrow money to meet her  needs.  Patient was able to identify things she had accomplished, including a dental appointment and applying for and receiving help with her electric bill.  We explored what enabled patient to accomplish these tasks and explored how she might use those strategies to help with her job search.  Patient committed to applying for at least one job and scheduling a medical appointment before the end of the day.      Progress:  Patient reported increased motivation to complete necessary tasks    Plan:   We will meet again in 2 weeks to address the patient's anxiety and depression.  Estimated duration of treatment is 10+ individual therapy sessions (08004) at twice monthly intervals. Treatment is expected to last at least until April 2024 with reassessment at that time.    Diagnosis:  Major Depressive Disorder, recurrent, mild  Generalized Anxiety Disorder

## 2023-11-10 ENCOUNTER — TELEPHONE (OUTPATIENT)
Dept: NEUROLOGY | Facility: CLINIC | Age: 38
End: 2023-11-10
Payer: COMMERCIAL

## 2023-11-10 NOTE — TELEPHONE ENCOUNTER
I called Pt to confirm if the appts I had found for her in 2024 would work. She confirmed they would. She asked me to tell you that if you have just 10 or 15 minutes to talk to her sometime between her 11/21 appt and the one on Jan 22, she would very much appreciate it. She turns 39 on Jan 14 and has some feelings about that.

## 2023-11-21 ENCOUNTER — VIRTUAL VISIT (OUTPATIENT)
Dept: NEUROLOGY | Facility: CLINIC | Age: 38
End: 2023-11-21
Payer: COMMERCIAL

## 2023-11-21 DIAGNOSIS — F33.0 MAJOR DEPRESSIVE DISORDER, RECURRENT EPISODE, MILD (H): Primary | ICD-10-CM

## 2023-11-21 PROCEDURE — 90832 PSYTX W PT 30 MINUTES: CPT | Mod: 95 | Performed by: PSYCHOLOGIST

## 2023-11-21 NOTE — PROGRESS NOTES
Psychology Progress Note    Date: November 21, 2023    Time length and type of treatment: 34 minutes (11:05 AM to 11:39 AM), individual therapy    After review of the patient's situation, this visit was changed from an in-person visit to a video visit via [employer]. Patient was informed that policies and procedures that govern in-person sessions would also apply to video sessions. Patient was in agreement with proceeding with a video session.    Patient Location: Patient home in [place], [place]  Provider Location:  [facility] - Provider remote location     Necessity: This session is necessary to address the patient's depression and anxiety.  Today we focus on the patient's treatment plan, specifically exploring thoughts and expectations of self and others and coping strategies.  The reader is invited to review the patient's full treatment plan in the Media section of the patient's Epic medical record.    Psychotherapeutic Technique: This writer utilized motivational interviewing, active listening, reassurance and support in the context of cognitive behavioral therapy to address the above.      Mental Status Evaluation:  Grooming: Within normal limits  Attire: Appropriate  Age: Appears Stated  Behavior Towards Examiner: Cooperative  Motor Activity: Within normal limits  Eye Contact: Appropriate  Mood: Anxious   Affect: full range  Speech/Language: normal  Attention: Normal  Concentration: Sufficient  Thought Process: unremarkable  Thought Content: Clear    Orientation: Appeared oriented to person, place, and time, though not formally established  Memory: No evidence of impairment.  Judgement: Adequate  Estimated Intelligence: Within normal limits  Demonstrated Insight: Adequate  Fund of Knowledge: Adequate    Intervention:   Patient is hosting [employer], which will include her boyfriend's family for the first time, and discussed her distress that her boyfriend spent the last two days  ignoring her phone calls and hasn't helped her prepare.  She discussed feeling overwhelmed and anxious as she tries to prepare without his help.  We discussed how to cope with overwhelm and some time was spent problem solving. Session ended early due to connectivity problems and patient's phone being low on charge.     Progress:  Patient calmed as the session progressed    Plan:   We will meet again in 2 weeks to address the patient's depression and anxiety.  Estimated duration of treatment is 10+ individual therapy sessions (22259) at twice monthly intervals. Treatment is expected to last at least until April 2024 with reassessment at that time.    Diagnosis:  Major Depressive Disorder, recurrent, mild  Generalized Anxiety Disorder

## 2023-11-27 ENCOUNTER — OFFICE VISIT (OUTPATIENT)
Dept: URGENT CARE | Facility: URGENT CARE | Age: 38
End: 2023-11-27
Payer: COMMERCIAL

## 2023-11-27 VITALS
SYSTOLIC BLOOD PRESSURE: 135 MMHG | DIASTOLIC BLOOD PRESSURE: 90 MMHG | BODY MASS INDEX: 26.58 KG/M2 | WEIGHT: 150 LBS | TEMPERATURE: 98.4 F | HEART RATE: 88 BPM | OXYGEN SATURATION: 99 % | HEIGHT: 63 IN

## 2023-11-27 DIAGNOSIS — K04.7 TOOTH INFECTION: Primary | ICD-10-CM

## 2023-11-27 PROCEDURE — 99213 OFFICE O/P EST LOW 20 MIN: CPT | Performed by: PHYSICIAN ASSISTANT

## 2023-11-27 RX ORDER — ACETAMINOPHEN AND CODEINE PHOSPHATE 300; 30 MG/1; MG/1
1 TABLET ORAL EVERY 6 HOURS PRN
Qty: 10 TABLET | Refills: 0 | Status: SHIPPED | OUTPATIENT
Start: 2023-11-27 | End: 2023-11-30

## 2023-11-28 NOTE — PROGRESS NOTES
Tooth infection  - amoxicillin-clavulanate (AUGMENTIN) 875-125 MG tablet; Take 1 tablet by mouth 2 times daily for 14 days  - acetaminophen-codeine (TYLENOL #3) 300-30 MG per tablet; Take 1 tablet by mouth every 6 hours as needed for severe pain       Abscessed Tooth: Care Instructions  Overview     An abscessed tooth is a tooth that has a pocket of pus in the tissues around it. Pus forms when the body tries to fight an infection caused by bacteria. If the pus cannot drain, it forms an abscess. An abscessed tooth can cause red, swollen gums and throbbing pain, especially when you chew. You may have a bad taste in your mouth and a fever, and your jaw may swell.  Damage to the tooth, untreated tooth decay, or gum disease can cause an abscessed tooth.  An abscessed tooth needs to be treated by a dental professional right away. If it is not treated, the infection could spread to other parts of your body. Your dentist will give you antibiotics to stop the infection. If antibiotics don't stop the infection, you may need other treatments.  Follow-up care is a key part of your treatment and safety. Be sure to make and go to all appointments, and call your doctor if you are having problems. It's also a good idea to know your test results and keep a list of the medicines you take.  How can you care for yourself at home?  Brush and floss gently.  Reduce pain and swelling in your face and jaw by putting ice or a cold pack on the outside of your cheek. Do this for 10 to 20 minutes at a time. Put a thin cloth between the ice and your skin.  Avoid using tobacco products. Tobacco and nicotine slow your ability to heal.  Take pain medicines exactly as directed.  If the doctor gave you a prescription medicine for pain, take it as prescribed.  If you are not taking a prescription pain medicine, ask your doctor if you can take an over-the-counter medicine such as acetaminophen (Tylenol) or ibuprofen (Advil or Motrin). Be safe with  "medicines. Read and follow all instructions on the label.  Take antibiotics as directed. Do not stop taking them just because you feel better. You need to take the full course of antibiotics.  When should you call for help?   Call 911 anytime you think you may need emergency care. For example, call if:    You have trouble breathing.   Call your doctor now or seek immediate medical care if:    You have new or worse symptoms of infection, such as:  Increased pain, swelling, warmth, or redness.  Red streaks leading from the area.  Pus draining from the area.  A fever.   Watch closely for changes in your health, and be sure to contact your doctor if:    You do not get better as expected.   Where can you learn more?  Go to https://www.[email or web address].net/patiented  Enter L466 in the search box to learn more about \"Abscessed Tooth: Care Instructions.\"  Current as of: November 13, 2022               Content Version: 13.8    5717-2519 [employer].   Care instructions adapted under license by your healthcare professional. If you have questions about a medical condition or this instruction, always ask your healthcare professional. [employer] disclaims any warranty or liability for your use of this information.           Follow-up with dentist for tooth extraction as soon as possible.    [facility]    Subjective   38 year old who presents to clinic today for the following health issues:    Urgent Care and Dental Pain       HPI     Patient visits today for 1-2 days of left-sided tooth pain and left-sided facial swelling that is tender to touch. Patient seems to be flared up by chewing steak. Patient denies fever, chills, body aches, or fatigue. Patient is established with  of  school of dentistry and she needs to make an appointment for tooth extraction.     Review of Systems   Review of Systems   See HPI    Objective    Temp: 98.4  F (36.9  C) Temp src: Temporal " BP: (!) 135/90 Pulse: 88     SpO2: 99 %       Physical Exam   Physical Exam  Constitutional:       General: She is not in acute distress.     Appearance: Normal appearance. She is normal weight. She is not ill-appearing, toxic-appearing or diaphoretic.   HENT:      Head: Normocephalic and atraumatic.      Mouth/Throat:      Dentition: Abnormal dentition. Dental tenderness and dental abscesses present.     Cardiovascular:      Rate and Rhythm: Normal rate.      Pulses: Normal pulses.   Pulmonary:      Effort: Pulmonary effort is normal. No respiratory distress.   Neurological:      General: No focal deficit present.      Mental Status: She is alert and oriented to person, place, and time. Mental status is at baseline.      Gait: Gait normal.   Psychiatric:         Mood and Affect: Mood normal.         Behavior: Behavior normal.         Thought Content: Thought content normal.         Judgment: Judgment normal.          No results found for this or any previous visit (from the past 24 hour(s)).

## 2024-01-22 ENCOUNTER — VIRTUAL VISIT (OUTPATIENT)
Dept: NEUROLOGY | Facility: CLINIC | Age: 39
End: 2024-01-22
Payer: COMMERCIAL

## 2024-01-22 DIAGNOSIS — F33.1 MAJOR DEPRESSIVE DISORDER, RECURRENT EPISODE, MODERATE (H): Primary | ICD-10-CM

## 2024-01-22 PROCEDURE — 90832 PSYTX W PT 30 MINUTES: CPT | Mod: 93 | Performed by: PSYCHOLOGIST

## 2024-01-22 ASSESSMENT — PATIENT HEALTH QUESTIONNAIRE - PHQ9
6. FEELING BAD ABOUT YOURSELF - OR THAT YOU ARE A FAILURE OR HAVE LET YOURSELF OR YOUR FAMILY DOWN: MORE THAN HALF THE DAYS
2. FEELING DOWN, DEPRESSED OR HOPELESS: SEVERAL DAYS
9. THOUGHTS THAT YOU WOULD BE BETTER OFF DEAD, OR OF HURTING YOURSELF: NOT AT ALL
4. FEELING TIRED OR HAVING LITTLE ENERGY: SEVERAL DAYS
SUM OF ALL RESPONSES TO PHQ QUESTIONS 1-9: 11
10. IF YOU CHECKED OFF ANY PROBLEMS, HOW DIFFICULT HAVE THESE PROBLEMS MADE IT FOR YOU TO DO YOUR WORK, TAKE CARE OF THINGS AT HOME, OR GET ALONG WITH OTHER PEOPLE: SOMEWHAT DIFFICULT
7. TROUBLE CONCENTRATING ON THINGS, SUCH AS READING THE NEWSPAPER OR WATCHING TELEVISION: SEVERAL DAYS
5. POOR APPETITE OR OVEREATING: MORE THAN HALF THE DAYS
3. TROUBLE FALLING OR STAYING ASLEEP OR SLEEPING TOO MUCH: NEARLY EVERY DAY
8. MOVING OR SPEAKING SO SLOWLY THAT OTHER PEOPLE COULD HAVE NOTICED. OR THE OPPOSITE, BEING SO FIGETY OR RESTLESS THAT YOU HAVE BEEN MOVING AROUND A LOT MORE THAN USUAL: NOT AT ALL
SUM OF ALL RESPONSES TO PHQ9 QUESTIONS 1 & 2: 2
1. LITTLE INTEREST OR PLEASURE IN DOING THINGS: SEVERAL DAYS

## 2024-01-22 ASSESSMENT — ANXIETY QUESTIONNAIRES
2. NOT BEING ABLE TO STOP OR CONTROL WORRYING: MORE THAN HALF THE DAYS
3. WORRYING TOO MUCH ABOUT DIFFERENT THINGS: SEVERAL DAYS
IF YOU CHECKED OFF ANY PROBLEMS ON THIS QUESTIONNAIRE, HOW DIFFICULT HAVE THESE PROBLEMS MADE IT FOR YOU TO DO YOUR WORK, TAKE CARE OF THINGS AT HOME, OR GET ALONG WITH OTHER PEOPLE: SOMEWHAT DIFFICULT
GAD7 TOTAL SCORE: 10
5. BEING SO RESTLESS THAT IT IS HARD TO SIT STILL: NOT AT ALL
7. FEELING AFRAID AS IF SOMETHING AWFUL MIGHT HAPPEN: SEVERAL DAYS
6. BECOMING EASILY ANNOYED OR IRRITABLE: MORE THAN HALF THE DAYS
1. FEELING NERVOUS, ANXIOUS, OR ON EDGE: MORE THAN HALF THE DAYS
4. TROUBLE RELAXING: MORE THAN HALF THE DAYS
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
## 2024-01-24 ENCOUNTER — OFFICE VISIT (OUTPATIENT)
Dept: URGENT CARE | Facility: URGENT CARE | Age: 39
End: 2024-01-24
Payer: COMMERCIAL

## 2024-01-24 VITALS
OXYGEN SATURATION: 98 % | BODY MASS INDEX: 27 KG/M2 | DIASTOLIC BLOOD PRESSURE: 91 MMHG | RESPIRATION RATE: 14 BRPM | HEART RATE: 104 BPM | SYSTOLIC BLOOD PRESSURE: 128 MMHG | WEIGHT: 150 LBS | TEMPERATURE: 98.7 F

## 2024-01-24 DIAGNOSIS — K04.7 DENTAL INFECTION: Primary | ICD-10-CM

## 2024-01-24 PROCEDURE — 99214 OFFICE O/P EST MOD 30 MIN: CPT | Performed by: PHYSICIAN ASSISTANT

## 2024-01-24 RX ORDER — ACETAMINOPHEN AND CODEINE PHOSPHATE 300; 30 MG/1; MG/1
1 TABLET ORAL EVERY 6 HOURS PRN
Qty: 10 TABLET | Refills: 0 | Status: SHIPPED | OUTPATIENT
Start: 2024-01-24 | End: 2024-01-27

## 2024-01-24 NOTE — PROGRESS NOTES
SUBJECTIVE:  Yocasta Kohli is a 39 year old female who presents to the clinic today for a thjird infection at problem site in 2 years. Began yesterday and impacted sleep.  Is being seen in early February for repair.      Past Medical History:   Diagnosis Date    Abnormal Pap smear of cervix     Colposcopy was normal    Eczema     Oral contraceptive use     Seasonal allergies     Spontaneous      Had twice    Vitamin D deficiency      Current Outpatient Medications   Medication Sig Dispense Refill    acetaminophen-codeine (TYLENOL #3) 300-30 MG per tablet Take 1 tablet by mouth every 6 hours as needed for severe pain 10 tablet 0    amoxicillin-clavulanate (AUGMENTIN) 875-125 MG tablet Take 1 tablet by mouth 2 times daily for 7 days 14 tablet 0    sertraline (ZOLOFT) 25 MG tablet Take 1 tablet (25 mg) by mouth daily (Patient not taking: Reported on 2023) 90 tablet 1     Social History     Tobacco Use    Smoking status: Every Day     Packs/day: 0.50     Years: 14.00     Additional pack years: 0.00     Total pack years: 7.00     Types: Cigarettes    Smokeless tobacco: Never   Substance Use Topics    Alcohol use: Yes     Alcohol/week: 1.7 standard drinks of alcohol     Types: 2 Standard drinks or equivalent per week     Comment: 3 drinks 4 times a week       ROS:  Review of systems negative except as stated above.    EXAM:   BP (!) 128/91   Pulse 104   Temp 98.7  F (37.1  C) (Temporal)   Resp 14   Wt 68 kg (150 lb)   SpO2 98%   BMI 27.00 kg/m    GENERAL: alert, no acute distress.  SKIN: right upper gums erythematous and tender  GENERAL APPEARANCE: healthy, alert and no distress  NECK: supple, non-tender to palpation, no adenopathy noted  NEURO: Normal strength and tone, sensory exam grossly normal,  normal speech and mentation    ASSESSMENT:  (K04.7) Dental infection  (primary encounter diagnosis)  Plan: amoxicillin-clavulanate (AUGMENTIN) 875-125 MG         tablet, acetaminophen-codeine  (TYLENOL #3)         300-30 MG per tablet          Red flags and emergent follow up discussed, and understood by patient  Follow up with PCP if symptoms worsen or fail to improve

## 2024-02-03 ENCOUNTER — HEALTH MAINTENANCE LETTER (OUTPATIENT)
Age: 39
End: 2024-02-03

## 2024-04-05 ENCOUNTER — OFFICE VISIT (OUTPATIENT)
Dept: NEUROLOGY | Facility: CLINIC | Age: 39
End: 2024-04-05
Payer: COMMERCIAL

## 2024-04-05 DIAGNOSIS — F41.1 GAD (GENERALIZED ANXIETY DISORDER): Primary | ICD-10-CM

## 2024-04-05 PROCEDURE — 90834 PSYTX W PT 45 MINUTES: CPT | Performed by: PSYCHOLOGIST
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
## 2024-04-19 ENCOUNTER — VIRTUAL VISIT (OUTPATIENT)
Dept: NEUROLOGY | Facility: CLINIC | Age: 39
End: 2024-04-19
Payer: COMMERCIAL

## 2024-04-19 DIAGNOSIS — F33.0 MAJOR DEPRESSIVE DISORDER, RECURRENT EPISODE, MILD (H): Primary | ICD-10-CM

## 2024-04-19 PROCEDURE — 90834 PSYTX W PT 45 MINUTES: CPT | Mod: 95 | Performed by: PSYCHOLOGIST

## 2024-04-19 ASSESSMENT — ANXIETY QUESTIONNAIRES
4. TROUBLE RELAXING: SEVERAL DAYS
6. BECOMING EASILY ANNOYED OR IRRITABLE: SEVERAL DAYS
IF YOU CHECKED OFF ANY PROBLEMS ON THIS QUESTIONNAIRE, HOW DIFFICULT HAVE THESE PROBLEMS MADE IT FOR YOU TO DO YOUR WORK, TAKE CARE OF THINGS AT HOME, OR GET ALONG WITH OTHER PEOPLE: VERY DIFFICULT
2. NOT BEING ABLE TO STOP OR CONTROL WORRYING: SEVERAL DAYS
7. FEELING AFRAID AS IF SOMETHING AWFUL MIGHT HAPPEN: SEVERAL DAYS
1. FEELING NERVOUS, ANXIOUS, OR ON EDGE: SEVERAL DAYS
3. WORRYING TOO MUCH ABOUT DIFFERENT THINGS: SEVERAL DAYS
5. BEING SO RESTLESS THAT IT IS HARD TO SIT STILL: NOT AT ALL
GAD7 TOTAL SCORE: 6

## 2024-04-19 ASSESSMENT — PATIENT HEALTH QUESTIONNAIRE - PHQ9
9. THOUGHTS THAT YOU WOULD BE BETTER OFF DEAD, OR OF HURTING YOURSELF: NOT AT ALL
4. FEELING TIRED OR HAVING LITTLE ENERGY: MORE THAN HALF THE DAYS
10. IF YOU CHECKED OFF ANY PROBLEMS, HOW DIFFICULT HAVE THESE PROBLEMS MADE IT FOR YOU TO DO YOUR WORK, TAKE CARE OF THINGS AT HOME, OR GET ALONG WITH OTHER PEOPLE: SOMEWHAT DIFFICULT
6. FEELING BAD ABOUT YOURSELF - OR THAT YOU ARE A FAILURE OR HAVE LET YOURSELF OR YOUR FAMILY DOWN: SEVERAL DAYS
1. LITTLE INTEREST OR PLEASURE IN DOING THINGS: SEVERAL DAYS
2. FEELING DOWN, DEPRESSED OR HOPELESS: SEVERAL DAYS
SUM OF ALL RESPONSES TO PHQ9 QUESTIONS 1 & 2: 2
8. MOVING OR SPEAKING SO SLOWLY THAT OTHER PEOPLE COULD HAVE NOTICED. OR THE OPPOSITE, BEING SO FIGETY OR RESTLESS THAT YOU HAVE BEEN MOVING AROUND A LOT MORE THAN USUAL: NOT AT ALL
3. TROUBLE FALLING OR STAYING ASLEEP OR SLEEPING TOO MUCH: NEARLY EVERY DAY
SUM OF ALL RESPONSES TO PHQ QUESTIONS 1-9: 10
7. TROUBLE CONCENTRATING ON THINGS, SUCH AS READING THE NEWSPAPER OR WATCHING TELEVISION: SEVERAL DAYS
5. POOR APPETITE OR OVEREATING: SEVERAL DAYS

## 2024-04-19 NOTE — PROGRESS NOTES
Psychology Progress Note    Date: April 19, 2024    Time length and type of treatment: 45 minutes (1:03 PM to 1:48 PM), individual therapy    Telemedicine visit: The patient's condition can be safely assessed and treated via synchronous audio and visual telemedicine encounter via [employer]. Patient was informed that policies and procedures that govern in-person sessions would also apply to video sessions. Patient was in agreement with proceeding with a video session.     Patient Location: [place], [place]  Provider Location:  [facility]    Necessity: This session is necessary to address the patient's depression and anxiety. Today we focus on revising the patient's treatment plan. The reader is invited to review the patient's full treatment plan in the Media section of the patient's Epic medical record.    Psychotherapeutic Technique: This writer utilized motivational interviewing, active listening, reassurance and support in the context of cognitive behavioral therapy to address the above.      Mental Status Evaluation:  Grooming: Within normal limits  Attire: Appropriate  Age: Appears Stated  Behavior Towards Examiner: Cooperative  Motor Activity: Within normal limits  Eye Contact: Appropriate  Mood: Sad   Affect: blunted  Speech/Language: normal  Attention: Normal  Concentration: Sufficient  Thought Process: unremarkable  Thought Content: Clear    Orientation: Appeared oriented to person, place, and time, though not formally established  Memory: No evidence of impairment.  Judgement: Adequate  Estimated Intelligence: Within normal limits  Demonstrated Insight: Adequate  Fund of Knowledge: Adequate    Intervention:   Patient was readministered the PHQ-9 and JAH-7 as part of revising her treatment plan.  Her score of 10 on the PHQ-9 is suggestive of mild depression and is similar to her earlier score of 11.  Her score of 6 on the JAH-7 is suggestive of mild anxiety and is an  improvement over her earlier score of 10.  Patient agreed this was accurate and expressed pride that she has remained relatively stable given substantial environmental stressors including needing to find a new apartment, struggling with a cold for 3 weeks which delayed her oral surgery, and lacking the funds to pay for a necessary tooth extraction.  Patient also reported sadness over her partner's infidelity.  Treatment plan was jointly revised and and remaining time patient was provided supportive psychotherapy as she discussed a recent argument with her partner.    Progress:  Patient treatment plan was jointly revised    Plan:   We will meet again in 1 month to address the patient's depression and anxiety.  Estimated duration of treatment is 10+ individual therapy sessions (67591) at monthly intervals. Treatment is expected to be completed by January 2025.     Diagnosis:  Major Depressive Disorder, Recurrent, Mild  Generalized Anxiety Disorder

## 2024-06-17 ENCOUNTER — OFFICE VISIT (OUTPATIENT)
Dept: INTERNAL MEDICINE | Facility: CLINIC | Age: 39
End: 2024-06-17
Payer: COMMERCIAL

## 2024-06-17 VITALS
WEIGHT: 159.2 LBS | HEIGHT: 63 IN | HEART RATE: 94 BPM | OXYGEN SATURATION: 99 % | BODY MASS INDEX: 28.21 KG/M2 | SYSTOLIC BLOOD PRESSURE: 138 MMHG | DIASTOLIC BLOOD PRESSURE: 94 MMHG | RESPIRATION RATE: 22 BRPM | TEMPERATURE: 98.4 F

## 2024-06-17 DIAGNOSIS — Z13.88 SCREENING FOR LEAD EXPOSURE: ICD-10-CM

## 2024-06-17 DIAGNOSIS — Z12.31 ENCOUNTER FOR SCREENING MAMMOGRAM FOR BREAST CANCER: ICD-10-CM

## 2024-06-17 DIAGNOSIS — O03.9 MISCARRIAGE: ICD-10-CM

## 2024-06-17 DIAGNOSIS — N92.6 IRREGULAR MENSES: ICD-10-CM

## 2024-06-17 DIAGNOSIS — R11.0 NAUSEA: ICD-10-CM

## 2024-06-17 DIAGNOSIS — Z11.3 SCREEN FOR STD (SEXUALLY TRANSMITTED DISEASE): ICD-10-CM

## 2024-06-17 DIAGNOSIS — Z00.00 ANNUAL PHYSICAL EXAM: Primary | ICD-10-CM

## 2024-06-17 DIAGNOSIS — E55.9 VITAMIN D DEFICIENCY: ICD-10-CM

## 2024-06-17 DIAGNOSIS — I10 PRIMARY HYPERTENSION: ICD-10-CM

## 2024-06-17 DIAGNOSIS — E78.5 HYPERLIPIDEMIA LDL GOAL <100: ICD-10-CM

## 2024-06-17 DIAGNOSIS — Z12.4 CERVICAL CANCER SCREENING: ICD-10-CM

## 2024-06-17 LAB
ALBUMIN SERPL BCG-MCNC: 4.1 G/DL (ref 3.5–5.2)
ALP SERPL-CCNC: 84 U/L (ref 40–150)
ALT SERPL W P-5'-P-CCNC: 11 U/L (ref 0–50)
ANION GAP SERPL CALCULATED.3IONS-SCNC: 11 MMOL/L (ref 7–15)
AST SERPL W P-5'-P-CCNC: 28 U/L (ref 0–45)
BASOPHILS # BLD AUTO: 0 10E3/UL (ref 0–0.2)
BASOPHILS NFR BLD AUTO: 1 %
BILIRUB SERPL-MCNC: 0.7 MG/DL
BUN SERPL-MCNC: 5.3 MG/DL (ref 6–20)
CALCIUM SERPL-MCNC: 9.1 MG/DL (ref 8.6–10)
CHLORIDE SERPL-SCNC: 101 MMOL/L (ref 98–107)
CHOLEST SERPL-MCNC: 205 MG/DL
CLUE CELLS: PRESENT
CREAT SERPL-MCNC: 0.54 MG/DL (ref 0.51–0.95)
DEPRECATED HCO3 PLAS-SCNC: 23 MMOL/L (ref 22–29)
EGFRCR SERPLBLD CKD-EPI 2021: >90 ML/MIN/1.73M2
EOSINOPHIL # BLD AUTO: 0.3 10E3/UL (ref 0–0.7)
EOSINOPHIL NFR BLD AUTO: 4 %
ERYTHROCYTE [DISTWIDTH] IN BLOOD BY AUTOMATED COUNT: 12.4 % (ref 10–15)
FASTING STATUS PATIENT QL REPORTED: ABNORMAL
FASTING STATUS PATIENT QL REPORTED: ABNORMAL
GLUCOSE SERPL-MCNC: 94 MG/DL (ref 70–99)
HCG UR QL: NEGATIVE
HCT VFR BLD AUTO: 45.4 % (ref 35–47)
HDLC SERPL-MCNC: 56 MG/DL
HGB BLD-MCNC: 14.9 G/DL (ref 11.7–15.7)
IMM GRANULOCYTES # BLD: 0 10E3/UL
IMM GRANULOCYTES NFR BLD: 0 %
LDLC SERPL CALC-MCNC: 126 MG/DL
LYMPHOCYTES # BLD AUTO: 1 10E3/UL (ref 0.8–5.3)
LYMPHOCYTES NFR BLD AUTO: 14 %
MCH RBC QN AUTO: 31.3 PG (ref 26.5–33)
MCHC RBC AUTO-ENTMCNC: 32.8 G/DL (ref 31.5–36.5)
MCV RBC AUTO: 95 FL (ref 78–100)
MONOCYTES # BLD AUTO: 0.6 10E3/UL (ref 0–1.3)
MONOCYTES NFR BLD AUTO: 9 %
NEUTROPHILS # BLD AUTO: 5.2 10E3/UL (ref 1.6–8.3)
NEUTROPHILS NFR BLD AUTO: 73 %
NONHDLC SERPL-MCNC: 149 MG/DL
PLATELET # BLD AUTO: 262 10E3/UL (ref 150–450)
POTASSIUM SERPL-SCNC: 4 MMOL/L (ref 3.4–5.3)
PROT SERPL-MCNC: 6.9 G/DL (ref 6.4–8.3)
RBC # BLD AUTO: 4.76 10E6/UL (ref 3.8–5.2)
SODIUM SERPL-SCNC: 135 MMOL/L (ref 135–145)
TRICHOMONAS, WET PREP: ABNORMAL
TRIGL SERPL-MCNC: 114 MG/DL
TSH SERPL DL<=0.005 MIU/L-ACNC: 1.45 UIU/ML (ref 0.3–4.2)
VIT D+METAB SERPL-MCNC: 16 NG/ML (ref 20–50)
WBC # BLD AUTO: 7.1 10E3/UL (ref 4–11)
WBC'S/HIGH POWER FIELD, WET PREP: ABNORMAL
YEAST, WET PREP: ABNORMAL

## 2024-06-17 PROCEDURE — 99214 OFFICE O/P EST MOD 30 MIN: CPT | Mod: 25 | Performed by: INTERNAL MEDICINE

## 2024-06-17 PROCEDURE — 85025 COMPLETE CBC W/AUTO DIFF WBC: CPT | Performed by: INTERNAL MEDICINE

## 2024-06-17 PROCEDURE — 80053 COMPREHEN METABOLIC PANEL: CPT | Performed by: INTERNAL MEDICINE

## 2024-06-17 PROCEDURE — 36415 COLL VENOUS BLD VENIPUNCTURE: CPT | Performed by: INTERNAL MEDICINE

## 2024-06-17 PROCEDURE — 99000 SPECIMEN HANDLING OFFICE-LAB: CPT | Performed by: INTERNAL MEDICINE

## 2024-06-17 PROCEDURE — 80061 LIPID PANEL: CPT | Performed by: INTERNAL MEDICINE

## 2024-06-17 PROCEDURE — 87624 HPV HI-RISK TYP POOLED RSLT: CPT | Performed by: INTERNAL MEDICINE

## 2024-06-17 PROCEDURE — 83655 ASSAY OF LEAD: CPT | Mod: 90 | Performed by: INTERNAL MEDICINE

## 2024-06-17 PROCEDURE — 82306 VITAMIN D 25 HYDROXY: CPT | Performed by: INTERNAL MEDICINE

## 2024-06-17 PROCEDURE — 84443 ASSAY THYROID STIM HORMONE: CPT | Performed by: INTERNAL MEDICINE

## 2024-06-17 PROCEDURE — 99396 PREV VISIT EST AGE 40-64: CPT | Performed by: INTERNAL MEDICINE

## 2024-06-17 PROCEDURE — 87491 CHLMYD TRACH DNA AMP PROBE: CPT | Performed by: INTERNAL MEDICINE

## 2024-06-17 PROCEDURE — 87210 SMEAR WET MOUNT SALINE/INK: CPT | Performed by: INTERNAL MEDICINE

## 2024-06-17 PROCEDURE — G0145 SCR C/V CYTO,THINLAYER,RESCR: HCPCS | Performed by: INTERNAL MEDICINE

## 2024-06-17 PROCEDURE — 87591 N.GONORRHOEAE DNA AMP PROB: CPT | Performed by: INTERNAL MEDICINE

## 2024-06-17 PROCEDURE — 81025 URINE PREGNANCY TEST: CPT | Performed by: INTERNAL MEDICINE

## 2024-06-17 RX ORDER — FLUTICASONE PROPIONATE 50 MCG
2 SPRAY, SUSPENSION (ML) NASAL
COMMUNITY
Start: 2023-10-12 | End: 2024-06-17

## 2024-06-17 RX ORDER — FAMOTIDINE 20 MG/1
TABLET, FILM COATED ORAL
COMMUNITY
Start: 2023-10-12 | End: 2024-06-17

## 2024-06-17 RX ORDER — ONDANSETRON 4 MG/1
4 TABLET, ORALLY DISINTEGRATING ORAL
COMMUNITY
Start: 2023-10-12 | End: 2024-06-17

## 2024-06-17 RX ORDER — ONDANSETRON 4 MG/1
4 TABLET, ORALLY DISINTEGRATING ORAL EVERY 8 HOURS PRN
Qty: 30 TABLET | Refills: 3 | Status: SHIPPED | OUTPATIENT
Start: 2024-06-17

## 2024-06-17 ASSESSMENT — PAIN SCALES - GENERAL: PAINLEVEL: MODERATE PAIN (4)

## 2024-06-17 ASSESSMENT — PATIENT HEALTH QUESTIONNAIRE - PHQ9
SUM OF ALL RESPONSES TO PHQ QUESTIONS 1-9: 6
10. IF YOU CHECKED OFF ANY PROBLEMS, HOW DIFFICULT HAVE THESE PROBLEMS MADE IT FOR YOU TO DO YOUR WORK, TAKE CARE OF THINGS AT HOME, OR GET ALONG WITH OTHER PEOPLE: SOMEWHAT DIFFICULT
SUM OF ALL RESPONSES TO PHQ QUESTIONS 1-9: 6

## 2024-06-17 NOTE — PATIENT INSTRUCTIONS
Please stop alcohol, it might be causing increased b/p and weight . Monitor b/p at home.     2. Labs today    3. Pap today.    4. Have mammogram done    5. Due to irregular bleeding and multiple miscarriages, see Gyn and have transvaginal US done

## 2024-06-17 NOTE — PROGRESS NOTES
Assessment & Plan     Annual physical exam  Will check labs today, Pap smear was obtained, mammogram was ordered, she refused vaccinations today.    - MA Screening Bilateral w/ Sami; Future  - Pap Screen with HPV - Recommended Age 30 - 65 Years    Hyperlipidemia LDL goal <100  She is not fasting  - Comprehensive metabolic panel  - Lipid panel reflex to direct LDL Fasting    Primary hypertension  Mild hypertension, discussed to cut out all alcohol for a while which will help to lower blood pressure and lose weight, she will try, certainly if lifestyle changes are not possible and blood pressure continues to be elevated, she will need to be on antihypertensive medication  - Comprehensive metabolic panel  - TSH with free T4 reflex    Vitamin D deficiency  - Vitamin D Deficiency    Screening for lead exposure  High levels of lead were noted in her tap water, currently she is using 5 water  - Lead Venous Blood Confirm  - CBC with platelets and differential    Nausea  Intermittent nausea diarrhea and cramps visit.,  She is a Zofran as needed  - ondansetron (ZOFRAN ODT) 4 MG ODT tab; Take 1 tablet (4 mg) by mouth every 8 hours as needed for nausea    Irregular menses  Due to frequent breakthrough vaginal bleeding, discussed to see gynecology, will also do a pelvic ultrasound.  - Ob/Gyn  Referral; Future  - HCG qualitative urine; Future  - US Pelvic Complete with Transvaginal; Future  - HCG qualitative urine    Miscarriage  She reports having 3 miscarriages in the first trimester  - Ob/Gyn  Referral; Future  - HCG qualitative urine; Future  - US Pelvic Complete with Transvaginal; Future  - HCG qualitative urine    Screen for STD (sexually transmitted disease)  Due to partner's infidelity would like to have STI screen today, currently asymptomatic  - Wet prep - Clinic Collect  - NEISSERIA GONORRHOEA PCR  - CHLAMYDIA TRACHOMATIS PCR      BMI  Estimated body mass index is 28.64 kg/m  as calculated from the  "following:    Height as of this encounter: 1.588 m (5' 2.52\").    Weight as of this encounter: 72.2 kg (159 lb 3.2 oz).       Subjective   Yocasta is a 39 year old, presenting for the following health issues:  Follow Up (Pt reports that she got results in the mail from [facility] that stated that she had a level of lead in water.  Pt states that she has stopped drinking tap water. Pt reports that she had migraines and bloody stools and this has stopped since \"March or April\". Pt reports that she does still get occasional headache and mentions that she needs dental work, 2 upper left teeth needing to be extracted. ) and Pap smear (Pt reports that she is wondering if she is due for a pap smear and  states that her last paper smear was July 2021. )        6/17/2024     2:58 PM   Additional Questions   Roomed by Mary     History of Present Illness       Reason for visit:  Blood work and pap smear    She eats 2-3 servings of fruits and vegetables daily.She consumes 0 sweetened beverage(s) daily.She exercises with enough effort to increase her heart rate 20 to 29 minutes per day.  She exercises with enough effort to increase her heart rate 3 or less days per week.   She is taking medications regularly.     Yocasta is a 39-year-old female with history of depression, anxiety, hyperlipidemia, recurrent miscarriages who is currently here for physical exam.    She reports constellation of symptoms including recurrent miscarriages, frequent menstrual bleeding in between.'s, headaches and bodyaches for a few years.  Recently she had her water checked at home and was noted to have high lead levels.  Since she switched to drinking bottled water several months ago her periods have gotten normal.  She wants to have testing done for lead exposure.    She is due for Pap smear, last menstrual period was on June 1 however there is still some blood in the cervical os today although she denies spotting.  She did have some " "cervical issue in her mom who had total abdominal hysterectomy she was little.  Due to has hyperlipidemia and hypertension.    Yocasta's blood pressure is elevated mildly.  She does not eat excessive salt but does drink 6 packs of beer 3 times a week.  She has gained some weight in the last year.    She works as a PCA and cleans houses, has been having more problems and pain in her right shoulder.    Review of systems: As above      Objective    BP (!) 138/94 (BP Location: Left arm, Patient Position: Sitting, Cuff Size: Adult Regular)   Pulse 94   Temp 98.4  F (36.9  C) (Tympanic)   Resp 22   Ht 1.588 m (5' 2.52\")   Wt 72.2 kg (159 lb 3.2 oz)   LMP 06/01/2024 (Exact Date)   SpO2 99%   BMI 28.64 kg/m    Body mass index is 28.64 kg/m .  Physical Exam   General: well appearing female, alert and oriented x3  EYES: Eyelids, conjunctiva, and sclera were normal. Pupils were normal.   HEAD, EARS, NOSE, MOUTH, AND THROAT: no cervical LAD, no thyromegaly or nodules appreciated. TMs are visualized and normal, oropharynx is clear.  RESPIRATORY: respirations non labored, CTA bl, no wheezes, rales, no forced expiratory wheezing.  CARDIOVASCULAR: Heart rate and rhythm were normal. No murmurs, rubs,gallops. There was no peripheral edema.   GASTROINTESTINAL: Positive bowel sounds, abdomen is soft, non tender, non distended.     MUSCULOSKELETAL: Muscle mass was normal for age. No joint synovitis or deformity.  LYMPHATIC: There were no enlarged nodes palpable.  SKIN/HAIR/NAILS: Skin color was normal.  No rashes.  NEUROLOGIC: The patient was alert and oriented.  Speech was normal.  There is no facial asymmetry.   PSYCHIATRIC:  Mood and affect were normal.   Breast exam: No axillary lymphadenopathy, breast masses or skin changes appreciated.    Gyn Exam: Normal external genitalia, cervix is slightly posterior, there is small amount of blood in cervical os, no cervical polyps noted, she does have nabothian cysts near her " cervix.  Bimanual exam without masses.        Signed Electronically by: Carol Ausitn MD

## 2024-06-17 NOTE — LETTER
"June 19, 2024      Yocasta Kohli  [address] [facility]  [place] [address]        Dear ,    We are writing to inform you of your test results.    Led levels are not elevated.    Resulted Orders   Lead Venous Blood Confirm   Result Value Ref Range    Lead Venous Blood <2.0 <=4.9 ug/dL      Comment:      INTERPRETIVE INFORMATION: Lead, Blood (Venous)    Analysis performed by Inductively Coupled Plasma-Mass   Spectrometry (ICP-MS).    Elevated results may be due to skin or collection-related   contamination, including the use of a noncertified   lead-free tube. If contamination concerns exist due to   elevated levels of blood lead, confirmation with a second   specimen collected in a certified lead-free tube is   recommended.    Information sources for blood lead reference intervals and   interpretive comments include the CDC's \"Childhood Lead   Poisoning Prevention: Recommended Actions Based on Blood   Lead Level\" and the \"Adult Blood Lead Epidemiology and   Surveillance: Reference Blood Lead Levels (BLLs) for Adults   in the [place].\" Thresholds and time intervals for retesting,   medical evaluation, and response vary by state and   regulatory body. Contact your State Department of Health   and/or applicable regulatory agency for specific guidance   on medical management  recommendations.    This test was developed and its performance characteristics   determined by [facility]. It has not been cleared or   approved by the U.S. Food and Drug Administration. This   test was performed in a CLIA-certified laboratory and is   intended for clinical purposes.         Group          Concentration   Comment    Children       3.5-19.9 ug/dL  Children under the age of 6                                 years are the most vulnerable                                 to the harmful effects of                                  lead exposure. Environmental                                  investigation " and exposure                                  history to identify potential                                 sources of lead. Biological                                  and nutritional monitoring                                 are recommended. Follow-up                                  blood lead monitoring is                                  recommended.                                 20-44.9 ug/dL   Lead hazard reduction and                                  prompt medical evaluation are                                 recommended. Contact a                                  Pediatric Environmental                                  Health Specialty Unit or                                  poison control center for                                  guidance.                   Greater than    Critical. Immediate medical                  44.9 ug/dL      evaluation, including                                  detailed neurological exam is                                 recommended. Consider                                  chelation therapy when                                 symptoms of lead toxicity   are                                  present. Contact a Pediatric                                  Environmental Health                                  Specialty Unit or poison                                  control center for                                   assistance.    Adult          5-19.9 ug/dL    Medical removal is                                  recommended for pregnant                                  women or those who are trying                                 or may become pregnant.                                  Adverse health effects are                                  possible. Reduced lead                                  exposure and increased blood                                  lead monitoring are                                  recommended.                    20-69.9 ug/dL   Adverse  health effects are                                  indicated. Medical removal                                  from lead exposure is                                  required by OSHA if blood                                  lead level exceeds 50 ug/dL.                                 Prompt medical evaluation is                                 recommended.                    Greater than    Critical. Immediate medical                   69.9 ug/dL      evaluation is recommended.                                  Consider chelation therapy                                 when symptoms of lead                                  toxicity are present.  Performed By: [facility]  [address] [place] [id number]  : Brian Bautista MD, PhD  CLIA Number: [id number]   Comprehensive metabolic panel   Result Value Ref Range    Sodium 135 135 - 145 mmol/L      Comment:      Reference intervals for this test were updated on 09/26/2023 to more accurately reflect our healthy population. There may be differences in the flagging of prior results with similar values performed with this method. Interpretation of those prior results can be made in the context of the updated reference intervals.     Potassium 4.0 3.4 - 5.3 mmol/L    Carbon Dioxide (CO2) 23 22 - 29 mmol/L    Anion Gap 11 7 - 15 mmol/L    Urea Nitrogen 5.3 (L) 6.0 - 20.0 mg/dL    Creatinine 0.54 0.51 - 0.95 mg/dL    GFR Estimate >90 >60 mL/min/1.73m2      Comment:      eGFR calculated using 2021 CKD-EPI equation.    Calcium 9.1 8.6 - 10.0 mg/dL    Chloride 101 98 - 107 mmol/L    Glucose 94 70 - 99 mg/dL    Alkaline Phosphatase 84 40 - 150 U/L    AST 28 0 - 45 U/L      Comment:      Reference intervals for this test were updated on 6/12/2023 to more accurately reflect our healthy population. There may be differences in the flagging of prior results with similar values performed with this method. Interpretation of those prior results can  be made in the context of the updated reference intervals.    ALT 11 0 - 50 U/L      Comment:      Reference intervals for this test were updated on 6/12/2023 to more accurately reflect our healthy population. There may be differences in the flagging of prior results with similar values performed with this method. Interpretation of those prior results can be made in the context of the updated reference intervals.      Protein Total 6.9 6.4 - 8.3 g/dL    Albumin 4.1 3.5 - 5.2 g/dL    Bilirubin Total 0.7 <=1.2 mg/dL    Patient Fasting > 8hrs? Unknown    Lipid panel reflex to direct LDL Fasting   Result Value Ref Range    Cholesterol 205 (H) <200 mg/dL    Triglycerides 114 <150 mg/dL    Direct Measure HDL 56 >=50 mg/dL    LDL Cholesterol Calculated 126 (H) <=100 mg/dL    Non HDL Cholesterol 149 (H) <130 mg/dL    Patient Fasting > 8hrs? Unknown     Narrative    Cholesterol  Desirable:  <200 mg/dL    Triglycerides  Normal:  Less than 150 mg/dL  Borderline High:  150-199 mg/dL  High:  200-499 mg/dL  Very High:  Greater than or equal to 500 mg/dL    Direct Measure HDL  Female:  Greater than or equal to 50 mg/dL   Male:  Greater than or equal to 40 mg/dL    LDL Cholesterol  Desirable:  <100mg/dL  Above Desirable:  100-129 mg/dL   Borderline High:  130-159 mg/dL   High:  160-189 mg/dL   Very High:  >= 190 mg/dL    Non HDL Cholesterol  Desirable:  130 mg/dL  Above Desirable:  130-159 mg/dL  Borderline High:  160-189 mg/dL  High:  190-219 mg/dL  Very High:  Greater than or equal to 220 mg/dL   TSH with free T4 reflex   Result Value Ref Range    TSH 1.45 0.30 - 4.20 uIU/mL   Vitamin D Deficiency   Result Value Ref Range    Vitamin D, Total (25-Hydroxy) 16 (L) 20 - 50 ng/mL      Comment:      mild to moderate deficiency    Narrative    Season, race, dietary intake, and treatment affect the concentration of 25-hydroxy-Vitamin D. Values may decrease during winter months and increase during summer months.    Vitamin D determination  is routinely performed by an immunoassay specific for 25 hydroxyvitamin D3.  If an individual is on vitamin D2(ergocalciferol) supplementation, please specify 25 OH vitamin D2 and D3 level determination by LCMSMS test VITD23.     HCG qualitative urine   Result Value Ref Range    hCG Urine Qualitative Negative Negative      Comment:      This test is for screening purposes.  Results should be interpreted along with the clinical picture.  Confirmation testing is available if warranted by ordering UTB302, HCG Quantitative Pregnancy.   CBC with platelets and differential   Result Value Ref Range    WBC Count 7.1 4.0 - 11.0 10e3/uL    RBC Count 4.76 3.80 - 5.20 10e6/uL    Hemoglobin 14.9 11.7 - 15.7 g/dL    Hematocrit 45.4 35.0 - 47.0 %    MCV 95 78 - 100 fL    MCH 31.3 26.5 - 33.0 pg    MCHC 32.8 31.5 - 36.5 g/dL    RDW 12.4 10.0 - 15.0 %    Platelet Count 262 150 - 450 10e3/uL    % Neutrophils 73 %    % Lymphocytes 14 %    % Monocytes 9 %    % Eosinophils 4 %    % Basophils 1 %    % Immature Granulocytes 0 %    Absolute Neutrophils 5.2 1.6 - 8.3 10e3/uL    Absolute Lymphocytes 1.0 0.8 - 5.3 10e3/uL    Absolute Monocytes 0.6 0.0 - 1.3 10e3/uL    Absolute Eosinophils 0.3 0.0 - 0.7 10e3/uL    Absolute Basophils 0.0 0.0 - 0.2 10e3/uL    Absolute Immature Granulocytes 0.0 <=0.4 10e3/uL   Wet prep - Clinic Collect   Result Value Ref Range    Trichomonas Absent Absent    Yeast Absent Absent    Clue Cells Present (A) Absent    WBCs/high power field 3+ (A) None   NEISSERIA GONORRHOEA PCR   Result Value Ref Range    Neisseria gonorrhoeae Negative Negative      Comment:      Negative for N. gonorrhoeae rRNA by transcription mediated amplification. A negative result by transcription mediated amplification does not preclude the presence of C. trachomatis infection because results are dependent on proper and adequate collection, absence of inhibitors and sufficient rRNA to be detected.   CHLAMYDIA TRACHOMATIS PCR   Result Value Ref  Range    Chlamydia trachomatis Negative Negative      Comment:      A negative result by transcription mediated amplification does not preclude the presence of C. trachomatis infection because results are dependent on proper and adequate collection, absence of inhibitors and sufficient rRNA to be detected.       If you have any questions or concerns, please call the clinic at the number listed above.       Sincerely,      Carol Austin MD

## 2024-06-18 LAB
C TRACH DNA SPEC QL NAA+PROBE: NEGATIVE
HPV HR 12 DNA CVX QL NAA+PROBE: NEGATIVE
HPV16 DNA CVX QL NAA+PROBE: NEGATIVE
HPV18 DNA CVX QL NAA+PROBE: NEGATIVE
HUMAN PAPILLOMA VIRUS FINAL DIAGNOSIS: NORMAL
N GONORRHOEA DNA SPEC QL NAA+PROBE: NEGATIVE

## 2024-06-19 ENCOUNTER — TELEPHONE (OUTPATIENT)
Dept: INTERNAL MEDICINE | Facility: CLINIC | Age: 39
End: 2024-06-19
Payer: COMMERCIAL

## 2024-06-19 DIAGNOSIS — B96.89 BV (BACTERIAL VAGINOSIS): Primary | ICD-10-CM

## 2024-06-19 DIAGNOSIS — N76.0 BV (BACTERIAL VAGINOSIS): Primary | ICD-10-CM

## 2024-06-19 LAB — LEAD BLDV-MCNC: <2 UG/DL

## 2024-06-19 RX ORDER — METRONIDAZOLE 7.5 MG/G
1 GEL VAGINAL DAILY
Qty: 25 G | Refills: 0 | Status: SHIPPED | OUTPATIENT
Start: 2024-06-19 | End: 2024-06-24

## 2024-06-19 NOTE — TELEPHONE ENCOUNTER
Please let pt know lab results:    STD screen is negative.  Vaginal swab does show mild BV (bacterial overgrowth),I'm sending Rx for vaginal cream to help treat it.     Vitamin D level is low, she should start taking vit d supplement OTC 2,000 units a day.    Kidney,liver, thyroid functions, red cell count  and sugar are normal.    Ldl cholesterol is mildly elevated: she should cut back on red meat and animal fats.    Lead testing will take longer to result. We'll notify her if abnormal.    Please have transvaginal US done due to frequent vaginal bleeding and see Gyn.   Paps mear results will be communicated separately.

## 2024-06-19 NOTE — TELEPHONE ENCOUNTER
Notified patient of results message. Patient verbalizes understanding. She has contacted her OBGYN and has an appointment already scheduled.

## 2024-06-20 LAB
BKR LAB AP GYN ADEQUACY: NORMAL
BKR LAB AP GYN INTERPRETATION: NORMAL
BKR LAB AP LMP: NORMAL
BKR LAB AP PREVIOUS ABNORMAL: NORMAL
PATH REPORT.COMMENTS IMP SPEC: NORMAL
PATH REPORT.COMMENTS IMP SPEC: NORMAL
PATH REPORT.RELEVANT HX SPEC: NORMAL

## 2024-06-27 ENCOUNTER — OFFICE VISIT (OUTPATIENT)
Dept: URGENT CARE | Facility: URGENT CARE | Age: 39
End: 2024-06-27
Payer: COMMERCIAL

## 2024-06-27 VITALS
DIASTOLIC BLOOD PRESSURE: 86 MMHG | OXYGEN SATURATION: 99 % | HEART RATE: 86 BPM | BODY MASS INDEX: 28.7 KG/M2 | HEIGHT: 63 IN | RESPIRATION RATE: 16 BRPM | TEMPERATURE: 98.9 F | WEIGHT: 162 LBS | SYSTOLIC BLOOD PRESSURE: 120 MMHG

## 2024-06-27 DIAGNOSIS — K04.7 DENTAL INFECTION: Primary | ICD-10-CM

## 2024-06-27 PROCEDURE — 99213 OFFICE O/P EST LOW 20 MIN: CPT | Performed by: PHYSICIAN ASSISTANT

## 2024-06-27 RX ORDER — IBUPROFEN 800 MG/1
800 TABLET, FILM COATED ORAL EVERY 8 HOURS PRN
Qty: 60 TABLET | Refills: 0 | Status: SHIPPED | OUTPATIENT
Start: 2024-06-27

## 2024-06-27 NOTE — PROGRESS NOTES
Dental infection  - amoxicillin-clavulanate (AUGMENTIN) 875-125 MG tablet; Take 1 tablet by mouth 2 times daily for 10 days  - Dental Referral; Future  - ibuprofen (ADVIL/MOTRIN) 800 MG tablet; Take 1 tablet (800 mg) by mouth every 8 hours as needed for moderate pain       Abscessed Tooth: Care Instructions  Overview     An abscessed tooth is a tooth that has a pocket of pus in the tissues around it. Pus forms when the body tries to fight an infection caused by bacteria. If the pus cannot drain, it forms an abscess. An abscessed tooth can cause red, swollen gums and throbbing pain, especially when you chew. You may have a bad taste in your mouth and a fever, and your jaw may swell.  Damage to the tooth, untreated tooth decay, or gum disease can cause an abscessed tooth.  An abscessed tooth needs to be treated by a dental professional right away. If it is not treated, the infection could spread to other parts of your body. Your dentist will give you antibiotics to stop the infection. If antibiotics don't stop the infection, you may need other treatments.  Follow-up care is a key part of your treatment and safety. Be sure to make and go to all appointments, and call your doctor if you are having problems. It's also a good idea to know your test results and keep a list of the medicines you take.  How can you care for yourself at home?  Brush and floss gently.  Reduce pain and swelling in your face and jaw by putting ice or a cold pack on the outside of your cheek. Do this for 10 to 20 minutes at a time. Put a thin cloth between the ice and your skin.  Avoid using tobacco products. Tobacco and nicotine slow your ability to heal.  Take pain medicines exactly as directed.  If the doctor gave you a prescription medicine for pain, take it as prescribed.  If you are not taking a prescription pain medicine, ask your doctor if you can take an over-the-counter medicine such as acetaminophen (Tylenol) or ibuprofen (Advil or  "Motrin). Be safe with medicines. Read and follow all instructions on the label.  Take antibiotics as directed. Do not stop taking them just because you feel better. You need to take the full course of antibiotics.  When should you call for help?   Call 911 anytime you think you may need emergency care. For example, call if:    You have trouble breathing.   Call your doctor now or seek immediate medical care if:    You have new or worse symptoms of infection, such as:  Increased pain, swelling, warmth, or redness.  Red streaks leading from the area.  Pus draining from the area.  A fever.   Watch closely for changes in your health, and be sure to contact your doctor if:    You do not get better as expected.   Where can you learn more?  Go to https://[email or web address].net/patiented  Enter L466 in the search box to learn more about \"Abscessed Tooth: Care Instructions.\"  Current as of: August 6, 2023               Content Version: 14.0    [id number] [employer].   Care instructions adapted under license by your healthcare professional. If you have questions about a medical condition or this instruction, always ask your healthcare professional. [employer] disclaims any warranty or liability for your use of this information.             Patient was advised to return to clinic for reevaluation (either UC or PCP) if symptoms do not improve in 7 days. Patient educated on red flag symptoms and asked to go directly to the ED if these symptoms present themselves.       [facility]    Subjective   39 year old who presents to clinic today for the following health issues:    Dental Problem and Urgent Care       HPI     Patient states that the pain began yesterday morning. She states that she has pain in the top two molars on the top left side. Pain radiating to the left ear. Denies any recent injury. Patient states that her dental appointment has been postponed several " times and she currently does not have an appointment. Patient had some left over amoxicillin that she began taking it. She has taken two of these.  Patient is having some swelling on the left side of her face. Patient states she has been having some headaches on the left side. She denies any fever, chills, body aches, and fatigue.     Review of Systems   Review of Systems   See HPI    Objective    Temp: 98.9  F (37.2  C) Temp src: Oral BP: 120/86 Pulse: 86   Resp: 16 SpO2: 99 %       Physical Exam   Physical Exam  Constitutional:       General: She is not in acute distress.     Appearance: Normal appearance. She is normal weight. She is not ill-appearing, toxic-appearing or diaphoretic.   HENT:      Head: Normocephalic and atraumatic.      Mouth/Throat:      Dentition: Dental tenderness, gingival swelling, dental caries and dental abscesses present.     Cardiovascular:      Rate and Rhythm: Normal rate.      Pulses: Normal pulses.   Pulmonary:      Effort: Pulmonary effort is normal. No respiratory distress.   Neurological:      General: No focal deficit present.      Mental Status: She is alert and oriented to person, place, and time. Mental status is at baseline.      Gait: Gait normal.   Psychiatric:         Mood and Affect: Mood normal.         Behavior: Behavior normal.         Thought Content: Thought content normal.         Judgment: Judgment normal.          No results found for this or any previous visit (from the past 24 hour(s)).

## 2024-07-09 ENCOUNTER — LAB REQUISITION (OUTPATIENT)
Dept: LAB | Facility: CLINIC | Age: 39
End: 2024-07-09
Payer: COMMERCIAL

## 2024-07-09 ENCOUNTER — MEDICAL CORRESPONDENCE (OUTPATIENT)
Dept: HEALTH INFORMATION MANAGEMENT | Facility: CLINIC | Age: 39
End: 2024-07-09

## 2024-07-09 ENCOUNTER — TRANSFERRED RECORDS (OUTPATIENT)
Dept: HEALTH INFORMATION MANAGEMENT | Facility: CLINIC | Age: 39
End: 2024-07-09

## 2024-07-09 ENCOUNTER — LAB REQUISITION (OUTPATIENT)
Dept: LAB | Facility: CLINIC | Age: 39
End: 2024-07-09

## 2024-07-09 ENCOUNTER — TRANSFERRED RECORDS (OUTPATIENT)
Dept: HEALTH INFORMATION MANAGEMENT | Facility: CLINIC | Age: 39
End: 2024-07-09
Payer: COMMERCIAL

## 2024-07-09 DIAGNOSIS — N96 RECURRENT PREGNANCY LOSS: ICD-10-CM

## 2024-07-09 PROCEDURE — 86147 CARDIOLIPIN ANTIBODY EA IG: CPT | Performed by: OBSTETRICS & GYNECOLOGY

## 2024-07-09 PROCEDURE — 86146 BETA-2 GLYCOPROTEIN ANTIBODY: CPT | Performed by: OBSTETRICS & GYNECOLOGY

## 2024-07-09 PROCEDURE — 88264 CHROMOSOME ANALYSIS 20-25: CPT | Mod: ORL | Performed by: OBSTETRICS & GYNECOLOGY

## 2024-07-09 PROCEDURE — 84443 ASSAY THYROID STIM HORMONE: CPT | Performed by: OBSTETRICS & GYNECOLOGY

## 2024-07-09 PROCEDURE — 82166 ASSAY ANTI-MULLERIAN HORM: CPT | Mod: ORL | Performed by: OBSTETRICS & GYNECOLOGY

## 2024-07-09 PROCEDURE — 99207 CHROMOSOME ANALYSIS, BLOOD, HIGH RESOLUTION: CPT | Performed by: MEDICAL GENETICS, PH.D. MEDICAL GENETICS

## 2024-07-10 LAB
B2 GLYCOPROT1 IGG SERPL IA-ACNC: 1 U/ML
B2 GLYCOPROT1 IGM SERPL IA-ACNC: <2.4 U/ML
CARDIOLIPIN IGG SER IA-ACNC: 5 GPL-U/ML
CARDIOLIPIN IGG SER IA-ACNC: NEGATIVE
CARDIOLIPIN IGM SER IA-ACNC: <2 MPL-U/ML
CARDIOLIPIN IGM SER IA-ACNC: NEGATIVE
MIS SERPL-MCNC: 0.2 NG/ML (ref 0.03–5.5)
TSH SERPL DL<=0.005 MIU/L-ACNC: 1.73 UIU/ML (ref 0.3–4.2)

## 2024-07-22 ENCOUNTER — VIRTUAL VISIT (OUTPATIENT)
Dept: NEUROLOGY | Facility: CLINIC | Age: 39
End: 2024-07-22
Payer: COMMERCIAL

## 2024-07-22 DIAGNOSIS — F33.0 MAJOR DEPRESSIVE DISORDER, RECURRENT EPISODE, MILD (H): Primary | ICD-10-CM

## 2024-07-22 LAB
CULTURE HARVEST COMPLETE DATE: NORMAL
INTERPRETATION: NORMAL
ISCN: NORMAL
METHODS: NORMAL

## 2024-07-22 PROCEDURE — 90834 PSYTX W PT 45 MINUTES: CPT | Mod: 95 | Performed by: PSYCHOLOGIST

## 2024-07-22 NOTE — PROGRESS NOTES
Psychology Progress Note    Date: July 22, 2024     Time length and type of treatment: 45 minutes (1:04 PM - 1:49 PM) , individual therapy    Telemedicine visit: The patient's condition can be safely assessed and treated via synchronous audio and visual telemedicine encounter via [employer]. Patient was informed that policies and procedures that govern in-person sessions would also apply to video sessions. Patient was in agreement with proceeding with a video session.     Patient Location: [place], [place]  Provider Location:  [facility] - Provider remote location     Necessity: This session is necessary to address the patient's depression and anxiety.  Today we focus on the patient's treatment plan, specifically exploring thoughts and expectations of self and others. The reader is invited to review the patient's full treatment plan in the Media section of the patient's Epic medical record.    Psychotherapeutic Technique: This writer utilized motivational interviewing, active listening, reassurance and support in the context of cognitive behavioral therapy to address the above.      Mental Status Evaluation:  Grooming: Within normal limits  Attire: Appropriate  Age: Appears Stated  Behavior Towards Examiner: Cooperative  Motor Activity: Within normal limits  Eye Contact: Appropriate  Mood: Sad   Affect: full range  Speech/Language: normal  Attention: Normal  Concentration: Sufficient  Thought Process: unremarkable  Thought Content: Clear    Orientation: Appeared oriented to person, place, and time, though not formally established  Memory: No evidence of impairment.  Judgement: Adequate  Estimated Intelligence: Within normal limits  Demonstrated Insight: Adequate  Fund of Knowledge: Adequate    Intervention:   Patient was provided supportive and insight oriented psychotherapy as she discussed difficult arguments with her mom and significant other, distress at some name calling she  experienced, and the challenge to not believe these critical messages.  She also discussed complex feelings around her personal appearance, her financial struggles, and her alcohol consumption.  Patient identified caring for others as giving meaning to her life and highlighted her work as a source of positive self esteem.      Progress:  Patient continues to sort out complex feelings about difficult relationships and struggles to sort out what she believes.      Plan:   We will meet again in 1 month to address the patient's depression and anxiety.  Estimated duration of treatment is 10+ individual therapy sessions (69573) at monthly intervals. Treatment is expected to be completed by January 2025.     Diagnosis:  Major Depressive Disorder, recurrent, mild  Generalized Anxiety Disorder

## 2024-08-15 ENCOUNTER — OFFICE VISIT (OUTPATIENT)
Dept: URGENT CARE | Facility: URGENT CARE | Age: 39
End: 2024-08-15
Payer: COMMERCIAL

## 2024-08-15 VITALS
WEIGHT: 161 LBS | TEMPERATURE: 98.3 F | DIASTOLIC BLOOD PRESSURE: 91 MMHG | OXYGEN SATURATION: 98 % | HEIGHT: 63 IN | RESPIRATION RATE: 16 BRPM | BODY MASS INDEX: 28.53 KG/M2 | SYSTOLIC BLOOD PRESSURE: 144 MMHG | HEART RATE: 89 BPM

## 2024-08-15 DIAGNOSIS — L03.317 CELLULITIS OF BUTTOCK: Primary | ICD-10-CM

## 2024-08-15 PROCEDURE — 99213 OFFICE O/P EST LOW 20 MIN: CPT | Performed by: STUDENT IN AN ORGANIZED HEALTH CARE EDUCATION/TRAINING PROGRAM

## 2024-08-15 NOTE — PATIENT INSTRUCTIONS
Take Augmentin as directed.  Warm bath or warm wash cloth for 15 minutes 2-3 times daily.    Monitor for signs of infection and return to clinic if any of these occur. Symptoms include:  Spreading redness around wound.  Increased swelling.  Pain/increased tenderness.  Discharge that looks thick or discolored (pus).  Fevers, chills, nausea, vomiting.

## 2024-08-15 NOTE — PROGRESS NOTES
ASSESSMENT & PLAN:   Diagnoses and all orders for this visit:  Cellulitis of buttock  -     amoxicillin-clavulanate (AUGMENTIN) 875-125 MG tablet; Take 1 tablet by mouth 2 times daily for 10 days    Left buttocks cellulitis - Augmentin x 10 days.    At the end of the encounter, I discussed results, diagnosis, medications. Discussed red flags for immediate return to clinic/ER, as well as indications for follow up if no improvement. Patient and/or caregiver understood and agreed to plan. Patient was stable for discharge.    Patient Instructions   Take Augmentin as directed.  Warm bath or warm wash cloth for 15 minutes 2-3 times daily.    Monitor for signs of infection and return to clinic if any of these occur. Symptoms include:  Spreading redness around wound.  Increased swelling.  Pain/increased tenderness.  Discharge that looks thick or discolored (pus).  Fevers, chills, nausea, vomiting.     No follow-ups on file.    ------------------------------------------------------------------------  SUBJECTIVE  History was obtained from patient.    Patient presents with:  Derm Problem: Worried growth is infected, has had it for x1 week   Urgent Care    HPI  Yocasta Kohli is a(n) 39 year old female presenting to urgent care for pimple on left buttocks x 1 week. Tried to pop it with no success. Has been having increased redness around it and some drainage.     Review of Systems    Current Outpatient Medications   Medication Sig Dispense Refill    amoxicillin-clavulanate (AUGMENTIN) 875-125 MG tablet Take 1 tablet by mouth 2 times daily for 10 days 20 tablet 0    ibuprofen (ADVIL/MOTRIN) 800 MG tablet Take 1 tablet (800 mg) by mouth every 8 hours as needed for moderate pain 60 tablet 0    ondansetron (ZOFRAN ODT) 4 MG ODT tab Take 1 tablet (4 mg) by mouth every 8 hours as needed for nausea (Patient not taking: Reported on 8/15/2024) 30 tablet 3     Problem List:  2014-08: Abnormal Pap smear of cervix- 2010 [place] was  "normal  2014-08: Tobacco abuse  2014-08: Eczema  2014-08: Seasonal allergies  2014-08: Oral contraceptive use  2014-08: Vitamin D deficiency    Allergies   Allergen Reactions    Animal Dander     Pollen Extract          OBJECTIVE  Vitals:    08/15/24 1706   BP: (!) 144/91   Pulse: 89   Resp: 16   Temp: 98.3  F (36.8  C)   TempSrc: Temporal   SpO2: 98%   Weight: 73 kg (161 lb)   Height: 1.588 m (5' 2.5\")     Physical Exam   GENERAL: healthy, alert, no acute distress.   PSYCH: mentation appears normal. Normal affect  SKIN: left buttocks with 2 erythematous patches approx 1 cm in diameter. One lesion small purulent collection, but no fluctuance. No active drainage or greer crusting.    No results found for any visits on 08/15/24.  "

## 2024-09-10 ENCOUNTER — VIRTUAL VISIT (OUTPATIENT)
Dept: NEUROLOGY | Facility: CLINIC | Age: 39
End: 2024-09-10
Payer: COMMERCIAL

## 2024-09-10 DIAGNOSIS — F33.0 MAJOR DEPRESSIVE DISORDER, RECURRENT EPISODE, MILD (H): Primary | ICD-10-CM

## 2024-09-10 PROCEDURE — 90834 PSYTX W PT 45 MINUTES: CPT | Mod: 95 | Performed by: PSYCHOLOGIST

## 2024-09-10 ASSESSMENT — PATIENT HEALTH QUESTIONNAIRE - PHQ9
SUM OF ALL RESPONSES TO PHQ9 QUESTIONS 1 & 2: 1
SUM OF ALL RESPONSES TO PHQ QUESTIONS 1-9: 7
6. FEELING BAD ABOUT YOURSELF - OR THAT YOU ARE A FAILURE OR HAVE LET YOURSELF OR YOUR FAMILY DOWN: SEVERAL DAYS
7. TROUBLE CONCENTRATING ON THINGS, SUCH AS READING THE NEWSPAPER OR WATCHING TELEVISION: NOT AT ALL
9. THOUGHTS THAT YOU WOULD BE BETTER OFF DEAD, OR OF HURTING YOURSELF: NOT AT ALL
5. POOR APPETITE OR OVEREATING: SEVERAL DAYS
4. FEELING TIRED OR HAVING LITTLE ENERGY: SEVERAL DAYS
1. LITTLE INTEREST OR PLEASURE IN DOING THINGS: NOT AT ALL
3. TROUBLE FALLING OR STAYING ASLEEP OR SLEEPING TOO MUCH: NEARLY EVERY DAY
10. IF YOU CHECKED OFF ANY PROBLEMS, HOW DIFFICULT HAVE THESE PROBLEMS MADE IT FOR YOU TO DO YOUR WORK, TAKE CARE OF THINGS AT HOME, OR GET ALONG WITH OTHER PEOPLE: SOMEWHAT DIFFICULT
8. MOVING OR SPEAKING SO SLOWLY THAT OTHER PEOPLE COULD HAVE NOTICED. OR THE OPPOSITE, BEING SO FIGETY OR RESTLESS THAT YOU HAVE BEEN MOVING AROUND A LOT MORE THAN USUAL: NOT AT ALL
2. FEELING DOWN, DEPRESSED OR HOPELESS: SEVERAL DAYS

## 2024-09-10 NOTE — PROGRESS NOTES
Psychology Progress Note    Date: September 10, 2024    Time length and type of treatment: 38 minutes (10:01 AM to 10:39 AM), individual therapy    Telemedicine visit: The patient's condition can be safely assessed and treated via synchronous audio and visual telemedicine encounter via [employer]. Patient was informed that policies and procedures that govern in-person sessions would also apply to video sessions. Patient was in agreement with proceeding with a video session.     Patient Location: Patient home in [place], [place]  Provider Location:  [facility] - Provider remote location     Necessity: This session is necessary to address the patient's depression and anxiety.  Today we focus on updating the patient's treatment plan. The reader is invited to review the patient's full treatment plan in the Media section of the patient's Epic medical record.    Psychotherapeutic Technique: This writer utilized motivational interviewing, active listening, reassurance and support in the context of cognitive behavioral therapy to address the above.      Mental Status Evaluation:  Grooming: Within normal limits  Attire: Appropriate  Age: Appears Stated  Behavior Towards Examiner: Cooperative  Motor Activity: Within normal limits  Eye Contact: Appropriate  Mood: Euthymic  Affect: full range  Speech/Language: normal  Attention: Normal  Concentration: Sufficient  Thought Process: unremarkable  Thought Content: Clear    Orientation: Appeared oriented to person, place, and time, though not formally established  Memory: No evidence of impairment.  Judgement: Adequate  Estimated Intelligence: Within normal limits  Demonstrated Insight: Adequate  Fund of Knowledge: Adequate    Intervention:   Patient was readministered the PHQ-9 and JAH-7 as part of updating her treatment plan.  Her score of 7 on the PHQ-9 is suggestive of mild depression and is an improvement over her earlier mild score.  Her score of 6 on  the JAH-7 is suggestive of mild anxiety and is identical to her earlier score.  Patient agreed this is accurate and attributed improved functioning to obtaining increased hours at work, which is both giving her a more consistent schedule and reducing finance related anxiety.  Patient's dad is also visiting, which is a source of additional support.  Treatment plan was jointly revised.  Session ended early due to patient scheduling conflict.    Progress:  Patient treatment plan was jointly revised    Plan:   We will meet again in 3 weeks to address the patient's depression and anxiety.  Estimated duration of treatment is 10+ individual therapy sessions (41933) at intervals of once every 3 to 4 weeks. Treatment is expected to be completed by January 2025.     Diagnosis:  Major Depressive Disorder, Recurrent, mild  Generalized Anxiety Disorder

## 2024-10-30 ENCOUNTER — VIRTUAL VISIT (OUTPATIENT)
Dept: NEUROLOGY | Facility: CLINIC | Age: 39
End: 2024-10-30
Payer: COMMERCIAL

## 2024-10-30 DIAGNOSIS — F33.0 MAJOR DEPRESSIVE DISORDER, RECURRENT EPISODE, MILD (H): Primary | ICD-10-CM

## 2024-10-30 PROCEDURE — 90834 PSYTX W PT 45 MINUTES: CPT | Mod: 95 | Performed by: PSYCHOLOGIST

## 2024-10-30 NOTE — PROGRESS NOTES
Psychology Progress Note    Date: October 30, 2024    Time length and type of treatment: 39 minutes (11:01 AM - 11:40 AM), individual therapy    Telemedicine visit: The patient's condition can be safely assessed and treated via synchronous audio and visual telemedicine encounter via [employer]. Patient was informed that policies and procedures that govern in-person sessions would also apply to video sessions. Patient was in agreement with proceeding with a video session.     Patient Location: Patient home in [place], [place]  Provider Location:  [facility] - Provider remote location     Necessity: This session is necessary to address the patient's depression and anxiety. Today we focus on the patient's treatment plan, specifically exploring thoughts and expectations of self and others. The reader is invited to review the patient's full treatment plan in the Media section of the patient's Epic medical record.    Psychotherapeutic Technique: This writer utilized motivational interviewing, active listening, reassurance and support in the context of cognitive behavioral therapy to address the above.      Mental Status Evaluation:  Grooming: Within normal limits  Attire: Appropriate  Age: Appears Stated  Behavior Towards Examiner: Cooperative  Motor Activity: Within normal limits  Eye Contact: Appropriate  Mood: Depressed   Affect: blunted  Speech/Language: normal  Attention: Normal  Concentration: Sufficient  Thought Process: unremarkable  Thought Content: Clear    Orientation: Appeared oriented to person, place, and time, though not formally established  Memory: No evidence of impairment.  Judgement: Adequate  Estimated Intelligence: Within normal limits  Demonstrated Insight: Adequate  Fund of Knowledge: Adequate    Intervention:   Patient reported she had contemplated canceling, explaining that she learned last night that her boyfriend had again cheated on her with his 3-year-old's mom, she  had 7 teeth removed and feels unattractive, and her plans for Halloween fell through, and she did not feel like talking to anyone.  We explored possible alternate Halloween plans and affect lightened modestly as patient contemplated spending time with other friends. We also discussed plans for upcoming holidays and her birthday, and discussed what self care activities patient might engage in today.             Progress:  Affect lightened modestly as the session progressed    Plan:   We will meet again in 3 weeks to address the patient's depression and anxieyt.  Estimated duration of treatment is 10+ individual therapy sessions (90843) at intervals of once every 3 - 4 weeks. Treatment is expected to be completed by January 2025.     Diagnosis:  Major Depressive Disorder, Recurrent, Mild  Generalized Anxiety Disorder

## 2024-12-18 ENCOUNTER — VIRTUAL VISIT (OUTPATIENT)
Dept: NEUROLOGY | Facility: CLINIC | Age: 39
End: 2024-12-18
Payer: COMMERCIAL

## 2024-12-18 DIAGNOSIS — F33.0 MAJOR DEPRESSIVE DISORDER, RECURRENT EPISODE, MILD (H): Primary | ICD-10-CM

## 2024-12-18 PROCEDURE — 90834 PSYTX W PT 45 MINUTES: CPT | Mod: 95 | Performed by: PSYCHOLOGIST

## 2024-12-18 NOTE — PROGRESS NOTES
Psychology Progress Note    Date: December 18, 2024    Time length and type of treatment: 44 minutes (3:00 PM to 3:44 PM), individual therapy    Telemedicine visit: The patient's condition can be safely assessed and treated via synchronous audio and visual telemedicine encounter via [employer]. Patient was informed that policies and procedures that govern in-person sessions would also apply to video sessions. Patient was in agreement with proceeding with a video session.     Patient Location: Patient home in [place], [place]  Provider Location:  [facility] - Provider remote location     Necessity: This session is necessary to address the patient's depression and anxiety.Today we focus on the patient's treatment plan, specifically exploring thoughts and expectations of self and others, and processing grief.  The reader is invited to review the patient's full treatment plan in the Media section of the patient's Epic medical record.    Psychotherapeutic Technique: This writer utilized motivational interviewing, active listening, reassurance and support in the context of cognitive behavioral therapy to address the above.      Mental Status Evaluation:  Grooming: Within normal limits  Attire: Appropriate  Age: Appears Stated  Behavior Towards Examiner: Cooperative  Motor Activity: Within normal limits  Eye Contact: Appropriate  Mood: Sad   Affect: full range  Speech/Language: normal  Attention: Normal  Concentration: Sufficient  Thought Process: unremarkable  Thought Content: Clear    Orientation: Appeared oriented to person, place, and time, though not formally established  Memory: No evidence of impairment.  Judgement: Adequate  Estimated Intelligence: Within normal limits  Demonstrated Insight: Adequate  Fund of Knowledge: Adequate    Intervention:   Patient was provided supportive psychotherapy as she discussed the death of a client and the unexpected death of a childhood friend, both  of which happened in quick succession, and her sadness over her inability to attend either . Patient also discussed financial stressors, and the challenge of buying Karina presents for those she loves on a tight budget.  We discussed patient Karina and birthday plans and hopes, and her disappointment over her relationship with her on-again/off-again partner.      Progress:  Affect lightened modestly as the session progressed    Plan:   We will meet again in 3 weeks to address the patient's depression and anxiety.  Estimated duration of treatment is 10+ individual therapy sessions (59819) at intervals of once every 3 weeks. Treatment is expected to be completed by 2025.     Diagnosis:  Major Depressive Disorder, Recurrent, Mild  Generalized Anxiety Disorder

## 2025-01-06 ENCOUNTER — OFFICE VISIT (OUTPATIENT)
Dept: URGENT CARE | Facility: URGENT CARE | Age: 40
End: 2025-01-06
Payer: COMMERCIAL

## 2025-01-06 ENCOUNTER — ANCILLARY PROCEDURE (OUTPATIENT)
Dept: GENERAL RADIOLOGY | Facility: CLINIC | Age: 40
End: 2025-01-06
Attending: PHYSICIAN ASSISTANT
Payer: COMMERCIAL

## 2025-01-06 VITALS
WEIGHT: 160 LBS | OXYGEN SATURATION: 96 % | DIASTOLIC BLOOD PRESSURE: 101 MMHG | HEIGHT: 62 IN | BODY MASS INDEX: 29.44 KG/M2 | RESPIRATION RATE: 17 BRPM | TEMPERATURE: 97.9 F | HEART RATE: 103 BPM | SYSTOLIC BLOOD PRESSURE: 152 MMHG

## 2025-01-06 DIAGNOSIS — J06.9 UPPER RESPIRATORY TRACT INFECTION, UNSPECIFIED TYPE: Primary | ICD-10-CM

## 2025-01-06 LAB
FLUAV AG SPEC QL IA: NEGATIVE
FLUBV AG SPEC QL IA: NEGATIVE

## 2025-01-06 PROCEDURE — 87798 DETECT AGENT NOS DNA AMP: CPT | Performed by: PHYSICIAN ASSISTANT

## 2025-01-06 PROCEDURE — 99214 OFFICE O/P EST MOD 30 MIN: CPT | Performed by: PHYSICIAN ASSISTANT

## 2025-01-06 PROCEDURE — 87804 INFLUENZA ASSAY W/OPTIC: CPT | Performed by: PHYSICIAN ASSISTANT

## 2025-01-06 PROCEDURE — 71046 X-RAY EXAM CHEST 2 VIEWS: CPT | Mod: TC | Performed by: STUDENT IN AN ORGANIZED HEALTH CARE EDUCATION/TRAINING PROGRAM

## 2025-01-06 PROCEDURE — 87635 SARS-COV-2 COVID-19 AMP PRB: CPT | Performed by: PHYSICIAN ASSISTANT

## 2025-01-06 RX ORDER — BENZONATATE 200 MG/1
200 CAPSULE ORAL 3 TIMES DAILY PRN
Qty: 21 CAPSULE | Refills: 0 | Status: SHIPPED | OUTPATIENT
Start: 2025-01-06

## 2025-01-06 NOTE — PROGRESS NOTES
Upper respiratory tract infection, unspecified type  - benzonatate (TESSALON) 200 MG capsule; Take 1 capsule (200 mg) by mouth 3 times daily as needed for cough.  - XR Chest 2 Views  - Influenza A & B Antigen - Clinic Collect  - COVID-19 Virus (Coronavirus) by PCR Nose  - B. pertussis/parapertussis PCR-NP      General Tips for All Ages:    Rest and Hydration:  Allow yourself the time to rest and prioritize hydration.  Stay well-hydrated with water, clear broths, and soothing herbal teas.    Symptomatic Relief:  Over-the-counter (OTC) medications can help alleviate symptoms.  Consider non-pharmacological options like a warm saltwater gargle for soothing a sore throat.    For Infants and Children (Under 2 Years):    Nasal Saline Drops:  Use saline drops to clear nasal congestion in infants.  Administer 1-2 drops in each nostril before feeding or bedtime.    Humidifier:  Place a cool-mist humidifier in the room to ease congestion.  Remember to clean the humidifier regularly.  Okay to use [employer]     Acetaminophen (if applicable):  Use acetaminophen for fever and discomfort.  Follow dosing guidelines based on your child's weight.  Avoid aspirin in children to prevent Reye's syndrome.    Contact Pediatrician:  If symptoms persist or worsen, or if your child shows signs of respiratory distress, contact your pediatrician.    For Children (2-12 Years):    Nasal Saline Solution:  Encourage the use of saline nasal spray for congestion relief.  Teach your child to blow their nose gently.    Honey (for those over 1 year):  Use honey to soothe a cough (1-2 teaspoons as needed).  Do not give honey to children under 1 year due to the risk of botulism.    Acetaminophen or Ibuprofen:  Use acetaminophen or ibuprofen for fever and pain relief.  Follow dosing guidelines based on your child's weight.    Fluid Intake:  Ensure your child drinks warm liquids like herbal teas and broths.  Monitor their fluid intake to keep them  hydrated.    For Adolescents and Adults (12 Years and Older):    Decongestants (Pseudoephedrine/Phenylephrine):  Consider OTC decongestants for nasal congestion.  Use with caution if you have hypertension, and avoid prolonged use.    Cough Suppressants (Dextromethorphan):  Choose a cough suppressant for persistent cough.  Avoid in children under 12 years; use honey instead.  Follow package instructions and avoid multiple medications with similar ingredients.    Pain Relievers (Acetaminophen or Ibuprofen):  Use acetaminophen or ibuprofen for pain and fever.  Follow package instructions.  Take ibuprofen with food to minimize stomach upset.    Rest and Isolation:  Prioritize rest and stay at home to prevent spreading the infection.    For All Ages:    Hydration:  Ensure you stay well-hydrated; monitor urine color to gauge hydration.    Hand Hygiene:  Wash hands with soap and water for at least 20 seconds.  Use hand  with at least 60% alcohol if soap is unavailable.    Avoid Tobacco Smoke:  Stay away from tobacco smoke, which can worsen respiratory symptoms.    Seek Medical Attention:  If symptoms worsen or if you experience difficulty breathing, seek medical attention promptly.  Look out for red flag symptoms like persistent high fever, severe headache, or chest pain.    Patient advised to return to clinic for reevaluation (either UC or PCP) if symptoms do not improve in 7 days. Patient educated on red flag symptoms and asked to go directly to the ED if these symptoms present themselves.     Remember, this guide is meant to assist you, but individual cases may vary. If you have concerns or if symptoms persist, don't hesitate to reach out to a healthcare professional. Wishing you a speedy recovery!      Gokul Rutherford PA-C  [facility]    Subjective   39 year old who presents to clinic today for the following health issues:    Cough, Urgent Care, and Letter for School/Work       HPI      Acute Illness  Acute illness concerns: X5 days of cough. Wants letter to stay home from work.  Symptoms:  Fever: No  Chills/Sweats: YES  Headache (location?): Mild  Sinus Pressure: YES  Conjunctivitis:  No  Ear Pain: Some irritation in the ears   Rhinorrhea: No  Congestion: YES  Sore Throat: Throat irritation   Cough: YES- Productive   Wheeze: YES and some shortness of breath   Decreased Appetite: No  Nausea: No  Vomiting: No  Diarrhea: YES  Dysuria/Freq.: No  Dysuria or Hematuria: No  Fatigue/Achiness: YES  Sick/Strep Exposure: No  Therapies tried and outcome: Dayquil, Nyquil, and Ibuprofen     Review of Systems   Review of Systems   See HPI    Objective    Temp: 97.9  F (36.6  C) Temp src: Temporal BP: (!) 152/101 Pulse: 103   Resp: 17 SpO2: 96 %       Physical Exam   Physical Exam  Constitutional:       General: She is not in acute distress.     Appearance: Normal appearance. She is normal weight. She is not ill-appearing, toxic-appearing or diaphoretic.   HENT:      Head: Normocephalic and atraumatic.      Right Ear: Tympanic membrane, ear canal and external ear normal. There is no impacted cerumen.      Left Ear: Tympanic membrane, ear canal and external ear normal. There is no impacted cerumen.      Nose: Congestion and rhinorrhea present.      Mouth/Throat:      Mouth: Mucous membranes are moist.      Pharynx: No oropharyngeal exudate or posterior oropharyngeal erythema.   Cardiovascular:      Rate and Rhythm: Normal rate and regular rhythm.      Pulses: Normal pulses.      Heart sounds: Normal heart sounds. No murmur heard.     No friction rub. No gallop.   Pulmonary:      Effort: Pulmonary effort is normal. No respiratory distress.      Breath sounds: No stridor. No wheezing, rhonchi or rales.   Chest:      Chest wall: No tenderness.   Lymphadenopathy:      Cervical: No cervical adenopathy.   Neurological:      General: No focal deficit present.      Mental Status: She is alert and oriented to person,  place, and time. Mental status is at baseline.      Gait: Gait normal.   Psychiatric:         Mood and Affect: Mood normal.         Behavior: Behavior normal.         Thought Content: Thought content normal.         Judgment: Judgment normal.          Results for orders placed or performed in visit on 01/06/25 (from the past 24 hours)   Influenza A & B Antigen - Clinic Collect    Specimen: Nose; Swab   Result Value Ref Range    Influenza A antigen Negative Negative    Influenza B antigen Negative Negative    Narrative    Test results must be correlated with clinical data. If necessary, results should be confirmed by a molecular assay or viral culture.   XR Chest 2 Views    Narrative    EXAM: XR CHEST 2 VIEWS  LOCATION: [facility]  DATE: 1/6/2025    INDICATION:  Acute cough  COMPARISON: None.      Impression    IMPRESSION: No focal consolidation, pleural effusion or pneumothorax. Cardiomediastinal silhouette is unremarkable.

## 2025-01-06 NOTE — LETTER
January 6, 2025      Yocasta Kohli  [address] [place] [address]        To Whom It May Concern:    Yocasta Kohli  was seen on 1/6/25.  Please excuse her for the next 2-5 days due to illness.        Sincerely,        Gokul Rutherford PA-C    Electronically signed

## 2025-01-07 LAB
B PARAPERT DNA SPEC QL NAA+PROBE: NOT DETECTED
B PERT DNA SPEC QL NAA+PROBE: NOT DETECTED
SARS-COV-2 RNA RESP QL NAA+PROBE: NEGATIVE

## 2025-04-21 ENCOUNTER — VIRTUAL VISIT (OUTPATIENT)
Dept: NEUROLOGY | Facility: CLINIC | Age: 40
End: 2025-04-21
Payer: COMMERCIAL

## 2025-04-21 DIAGNOSIS — F33.1 MAJOR DEPRESSIVE DISORDER, RECURRENT EPISODE, MODERATE (H): Primary | ICD-10-CM

## 2025-04-21 PROCEDURE — 90834 PSYTX W PT 45 MINUTES: CPT | Mod: 95 | Performed by: PSYCHOLOGIST

## 2025-04-21 NOTE — PROGRESS NOTES
Psychology Progress Note    Date: April 21, 2025    Time length and type of treatment: 47 minutes (2:00 PM to 2:47 PM), individual therapy    Telemedicine visit: The patient's condition can be safely assessed and treated via synchronous audio and visual telemedicine encounter via [employer]. Patient was informed that policies and procedures that govern in-person sessions would also apply to video sessions. Patient was in agreement with proceeding with a video session.     Patient Location: Patient home in [place], [place]  Provider Location:  [facility] - Provider remote location     Necessity: This session is necessary to address the patient's anxiety and depression.  Today we focus on the patient's treatment plan, specifically exploring thoughts and expectations of self and others.  The reader is invited to review the patient's full treatment plan in the Media section of the patient's Epic medical record.    Psychotherapeutic Technique: This writer utilized motivational interviewing, active listening, reassurance and support in the context of cognitive behavioral therapy to address the above.      Mental Status Evaluation:  Grooming: Within normal limits  Attire: Appropriate  Age: Appears Stated  Behavior Towards Examiner: Cooperative  Motor Activity: Within normal limits  Eye Contact: Appropriate  Mood: Anxious  Sad   Affect: full range  Speech/Language: normal  Attention: Normal  Concentration: Sufficient  Thought Process: unremarkable  Thought Content: Clear    Orientation: Appeared oriented to person, place, and time, though not formally established  Memory: No evidence of impairment.  Judgement: Adequate  Estimated Intelligence: Within normal limits  Demonstrated Insight: Adequate  Fund of Knowledge: Adequate    Intervention:   Patient was provided supportive and insight oriented psychotherapy as she discussed her relationship with her parents, her perception of favoritism for her  sister, and her hope that they will listen as she attempts to explain her hurt over a recently decision they made to provide financial support to her sister while her mom criticized patient's financial management. We discussed patient goals for this conversation and options If the conversation does not progress as planned.  Patient also discussed challenges she has faced scheduling needed dental care.       Progress:  Patient demonstrated increased insight into family dynamics    Plan:   We will meet again in 1 month to address the patient's anxiety and depression.  Estimated duration of treatment is 10+ individual therapy sessions (40199) at monthly intervals. Treatment is expected to last at least until July 2025 with reassessment at that time    Diagnosis:  Major Depressive Disorder, Recurrent, Moderate  Generalized Anxiety Disorder

## 2025-06-02 ENCOUNTER — VIRTUAL VISIT (OUTPATIENT)
Dept: NEUROLOGY | Facility: CLINIC | Age: 40
End: 2025-06-02
Payer: COMMERCIAL

## 2025-06-02 DIAGNOSIS — F33.0 MAJOR DEPRESSIVE DISORDER, RECURRENT EPISODE, MILD: Primary | ICD-10-CM

## 2025-06-02 PROCEDURE — 90834 PSYTX W PT 45 MINUTES: CPT | Mod: 95 | Performed by: PSYCHOLOGIST

## 2025-06-02 ASSESSMENT — ANXIETY QUESTIONNAIRES
7. FEELING AFRAID AS IF SOMETHING AWFUL MIGHT HAPPEN: NOT AT ALL
IF YOU CHECKED OFF ANY PROBLEMS ON THIS QUESTIONNAIRE, HOW DIFFICULT HAVE THESE PROBLEMS MADE IT FOR YOU TO DO YOUR WORK, TAKE CARE OF THINGS AT HOME, OR GET ALONG WITH OTHER PEOPLE: SOMEWHAT DIFFICULT
6. BECOMING EASILY ANNOYED OR IRRITABLE: NOT AT ALL
3. WORRYING TOO MUCH ABOUT DIFFERENT THINGS: SEVERAL DAYS
4. TROUBLE RELAXING: SEVERAL DAYS
2. NOT BEING ABLE TO STOP OR CONTROL WORRYING: NOT AT ALL
5. BEING SO RESTLESS THAT IT IS HARD TO SIT STILL: SEVERAL DAYS
1. FEELING NERVOUS, ANXIOUS, OR ON EDGE: SEVERAL DAYS
GAD7 TOTAL SCORE: 4

## 2025-06-02 ASSESSMENT — PATIENT HEALTH QUESTIONNAIRE - PHQ9
2. FEELING DOWN, DEPRESSED OR HOPELESS: SEVERAL DAYS
3. TROUBLE FALLING OR STAYING ASLEEP OR SLEEPING TOO MUCH: MORE THAN HALF THE DAYS
8. MOVING OR SPEAKING SO SLOWLY THAT OTHER PEOPLE COULD HAVE NOTICED. OR THE OPPOSITE, BEING SO FIGETY OR RESTLESS THAT YOU HAVE BEEN MOVING AROUND A LOT MORE THAN USUAL: SEVERAL DAYS
7. TROUBLE CONCENTRATING ON THINGS, SUCH AS READING THE NEWSPAPER OR WATCHING TELEVISION: NOT AT ALL
SUM OF ALL RESPONSES TO PHQ9 QUESTIONS 1 & 2: 1
1. LITTLE INTEREST OR PLEASURE IN DOING THINGS: NOT AT ALL
10. IF YOU CHECKED OFF ANY PROBLEMS, HOW DIFFICULT HAVE THESE PROBLEMS MADE IT FOR YOU TO DO YOUR WORK, TAKE CARE OF THINGS AT HOME, OR GET ALONG WITH OTHER PEOPLE: SOMEWHAT DIFFICULT
9. THOUGHTS THAT YOU WOULD BE BETTER OFF DEAD, OR OF HURTING YOURSELF: NOT AT ALL
5. POOR APPETITE OR OVEREATING: SEVERAL DAYS
6. FEELING BAD ABOUT YOURSELF - OR THAT YOU ARE A FAILURE OR HAVE LET YOURSELF OR YOUR FAMILY DOWN: SEVERAL DAYS
SUM OF ALL RESPONSES TO PHQ QUESTIONS 1-9: 7
4. FEELING TIRED OR HAVING LITTLE ENERGY: SEVERAL DAYS

## 2025-06-02 NOTE — PROGRESS NOTES
Psychology Progress Note    Date: June 02, 2025    Time length and type of treatment: 44 minutes (3:00 PM to 3:44 PM), individual therapy    Telemedicine visit:  The patient's condition can be safely assessed and treated via synchronous audio and visual telemedicine encounter via [employer]. Patient was informed that policies and procedures that govern in-person sessions would also apply to video sessions. Patient was in agreement with proceeding with a video session.     Patient Location: Patient home in [place], [place]  Provider Location:  [facility] - Provider remote location     Necessity: This session is necessary to address the patient's depression and anxiety. Today we focus on updating the patient's treatment plan. The reader is invited to review the patient's full treatment plan in the Media section of the patient's Epic medical record.    Psychotherapeutic Technique: This writer utilized motivational interviewing, active listening, reassurance and support in the context of cognitive behavioral therapy to address the above.      Mental Status Evaluation:  Grooming: Within normal limits  Attire: Appropriate  Age: Appears Stated  Behavior Towards Examiner: Cooperative  Motor Activity: Within normal limits  Eye Contact: Appropriate  Mood: Sad   Affect: full range  Speech/Language: normal  Attention: Normal  Concentration: Sufficient  Thought Process: unremarkable  Thought Content: Clear    Orientation: Appeared oriented to person, place, and time, though not formally established  Memory: No evidence of impairment.  Judgement: Adequate  Estimated Intelligence: Within normal limits  Demonstrated Insight: Adequate  Fund of Knowledge: Adequate    Intervention:   Patient was readministered the PHQ-9 and JAH-7 as part of updating her treatment plan.  Her score of 7 on the PHQ-9 is suggestive of mild depression and is an improvement over her earlier moderate score of 11.  Her score of 4 on  the JAH-7 is suggestive of minimal anxiety and represents an improvement over her earlier mild score of 7.  Patient agreed that her depression has reduced, but expressed surprise that her anxiety score is not higher.  Patient noted she has had a good couple of weeks, but her landlord is selling her apartment which is very affordable and stated she is very anxious about where she will live once the sale goes through.  Patient also expressed concern about her long-term boyfriend's continuing contact with his child's mother and concern about whether a long planned trip will be possible.  Treatment plan was jointly revised and in remaining time patient was provided supportive psychotherapy.    Progress:  Patient treatment plan was jointly revised    Plan:   We will meet again in 1 month to address the patient's depression and anxiety.  Estimated duration of treatment is 10+ individual therapy sessions (74485) at monthly intervals. Treatment is expected to be completed by June 2026.     Diagnosis:  Major Depressive Disorder, Recurrent, Mild  Generalized Anxiety Disorder

## 2025-08-04 ENCOUNTER — PATIENT OUTREACH (OUTPATIENT)
Dept: CARE COORDINATION | Facility: CLINIC | Age: 40
End: 2025-08-04

## 2025-08-04 ENCOUNTER — VIRTUAL VISIT (OUTPATIENT)
Dept: NEUROLOGY | Facility: CLINIC | Age: 40
End: 2025-08-04
Payer: COMMERCIAL

## 2025-08-04 DIAGNOSIS — F33.0 MAJOR DEPRESSIVE DISORDER, RECURRENT EPISODE, MILD: Primary | ICD-10-CM

## 2025-08-04 PROCEDURE — 90834 PSYTX W PT 45 MINUTES: CPT | Mod: 95 | Performed by: PSYCHOLOGIST

## 2025-08-06 ENCOUNTER — PATIENT OUTREACH (OUTPATIENT)
Dept: CARE COORDINATION | Facility: CLINIC | Age: 40
End: 2025-08-06
Payer: COMMERCIAL

## 2025-08-25 ENCOUNTER — VIRTUAL VISIT (OUTPATIENT)
Dept: NEUROLOGY | Facility: CLINIC | Age: 40
End: 2025-08-25
Payer: COMMERCIAL

## 2025-08-25 DIAGNOSIS — F33.0 MAJOR DEPRESSIVE DISORDER, RECURRENT EPISODE, MILD: Primary | ICD-10-CM

## 2025-08-25 PROCEDURE — 90834 PSYTX W PT 45 MINUTES: CPT | Mod: 95 | Performed by: PSYCHOLOGIST
